# Patient Record
Sex: MALE | Race: WHITE | NOT HISPANIC OR LATINO | Employment: OTHER | URBAN - NONMETROPOLITAN AREA
[De-identification: names, ages, dates, MRNs, and addresses within clinical notes are randomized per-mention and may not be internally consistent; named-entity substitution may affect disease eponyms.]

---

## 2020-07-07 ENCOUNTER — OFFICE VISIT (OUTPATIENT)
Dept: PSYCHIATRY | Facility: CLINIC | Age: 55
End: 2020-07-07

## 2020-07-07 VITALS
DIASTOLIC BLOOD PRESSURE: 81 MMHG | WEIGHT: 262.6 LBS | SYSTOLIC BLOOD PRESSURE: 156 MMHG | HEART RATE: 84 BPM | TEMPERATURE: 97.3 F

## 2020-07-07 DIAGNOSIS — F51.04 PSYCHOPHYSIOLOGICAL INSOMNIA: ICD-10-CM

## 2020-07-07 DIAGNOSIS — F40.01 PANIC DISORDER WITH AGORAPHOBIA: Primary | ICD-10-CM

## 2020-07-07 DIAGNOSIS — F40.10 SOCIAL ANXIETY DISORDER: ICD-10-CM

## 2020-07-07 DIAGNOSIS — Z79.899 MEDICATION MANAGEMENT: ICD-10-CM

## 2020-07-07 DIAGNOSIS — F41.1 GAD (GENERALIZED ANXIETY DISORDER): ICD-10-CM

## 2020-07-07 DIAGNOSIS — F17.220 NICOTINE DEPENDENCE, CHEWING TOBACCO, UNCOMPLICATED: ICD-10-CM

## 2020-07-07 DIAGNOSIS — F11.21 OPIOID USE DISORDER, SEVERE, IN SUSTAINED REMISSION, ON MAINTENANCE THERAPY (HCC): ICD-10-CM

## 2020-07-07 LAB
AMPHETAMINE CUT-OFF: ABNORMAL
BENZODIAZIPINE CUT-OFF: ABNORMAL
BUPRENORPHINE CUT-OFF: ABNORMAL
COCAINE CUT-OFF: ABNORMAL
EXTERNAL AMPHETAMINE SCREEN URINE: NEGATIVE
EXTERNAL BENZODIAZEPINE SCREEN URINE: NEGATIVE
EXTERNAL BUPRENORPHINE SCREEN URINE: POSITIVE
EXTERNAL COCAINE SCREEN URINE: NEGATIVE
EXTERNAL MDMA: NEGATIVE
EXTERNAL METHADONE SCREEN URINE: NEGATIVE
EXTERNAL METHAMPHETAMINE SCREEN URINE: NEGATIVE
EXTERNAL OPIATES SCREEN URINE: NEGATIVE
EXTERNAL OXYCODONE SCREEN URINE: NEGATIVE
EXTERNAL THC SCREEN URINE: NEGATIVE
MDMA CUT-OFF: ABNORMAL
METHADONE CUT-OFF: ABNORMAL
METHAMPHETAMINE CUT-OFF: ABNORMAL
OPIATES CUT-OFF: ABNORMAL
OXYCODONE CUT-OFF: ABNORMAL
THC CUT-OFF: ABNORMAL

## 2020-07-07 PROCEDURE — 90792 PSYCH DIAG EVAL W/MED SRVCS: CPT | Performed by: PSYCHIATRY & NEUROLOGY

## 2020-07-07 RX ORDER — TRIAMCINOLONE ACETONIDE 1 MG/G
CREAM TOPICAL
COMMUNITY
Start: 2020-06-09

## 2020-07-07 RX ORDER — QUETIAPINE FUMARATE 50 MG/1
TABLET, FILM COATED ORAL
COMMUNITY
Start: 2020-07-03 | End: 2021-10-05 | Stop reason: SDUPTHER

## 2020-07-07 RX ORDER — LISINOPRIL 40 MG/1
TABLET ORAL
COMMUNITY
Start: 2020-07-03

## 2020-07-07 RX ORDER — ALBUTEROL SULFATE 90 UG/1
AEROSOL, METERED RESPIRATORY (INHALATION)
COMMUNITY
Start: 2020-06-01 | End: 2023-01-23

## 2020-07-07 RX ORDER — ONDANSETRON 4 MG/1
TABLET, ORALLY DISINTEGRATING ORAL
COMMUNITY
Start: 2020-04-22 | End: 2020-10-07

## 2020-07-07 RX ORDER — GABAPENTIN 800 MG/1
TABLET ORAL
COMMUNITY
Start: 2020-06-08

## 2020-07-07 RX ORDER — CLONAZEPAM 0.5 MG/1
0.5 TABLET ORAL DAILY PRN
Qty: 30 TABLET | Refills: 2 | Status: SHIPPED | OUTPATIENT
Start: 2020-07-07 | End: 2020-10-07 | Stop reason: SDUPTHER

## 2020-07-07 RX ORDER — BUPRENORPHINE HYDROCHLORIDE AND NALOXONE HYDROCHLORIDE 5.7; 1.4 MG/1; MG/1
TABLET, ORALLY DISINTEGRATING SUBLINGUAL
COMMUNITY
Start: 2020-07-06 | End: 2020-10-07

## 2020-07-07 NOTE — PROGRESS NOTES
"Subjective   Macario Aguilar is a 55 y.o. male who presents today for initial evaluation     Chief Complaint:  Anxiety, Panic Disorder, Social Anxiety    History of Present Illness: Patient is a 55-year-old  male with a history of anxiety, panic disorder, and social anxiety presenting for his initial evaluation by this provider.  He was previously seeing other providers but they have left or retired. His anxiety is currently managed by his PCP who referred him to our clinic today. He reports he has struggled with anxiety, \"my whole life.\" He reports first-line anxiety medications including Zoloft, Prozac, Effexor, Cymbalta, Paxil, and BuSpar.  He reports others that he cannot recall at the current moment.  He reports that he has generally had poor response or side effects to SSRI and SNRI medications.  BuSpar made things worse and made him feel that he was, \"crazy.\" He reports that Zoloft made him feel like a robot and decreased his sex drive. He reports it caused emotional blunting and anhedonia.  Cymbalta caused difficulty sleeping and restlessness with increased anxiety.  He is currently only managed on Seroquel for sleep and reports that it is helpful but it takes a while for it to kick in.  He had previously been on higher doses during hospitalization but it caused him to feel severely weak, lethargic, fatigued, and mentally foggy.  He has never had any inpatient admissions for psychiatric reasons but was hospitalized in [place] for what appeared to be neurologic or physical medical problems, however work-up was negative and they felt that he was having, \"a mental breakdown.\"  Patient currently does not see a psychiatrist or therapist.  He previously saw Dr. Wayne and was prescribed 0.5 mg of clonazepam once daily.  He reports that this helped the most and they did not increase this dose.  Patient does have a history of dependence on opioid pain medication.  He has never taken it outside of what was " prescribed to him but was injured on the job when he was working at a [employer] and was prescribed high doses of opioid pain medication for several years leading to physical dependence.  Patient attempted to stop the medications himself but had severe withdrawal and then saw another doctor who prescribed more pain medication which he was disgruntled by.  He eventually found a clinic for buprenorphine and has been managed on this successfully.  He takes 3 Zubsolv 5.7mg daily.     Patient currently has severe anxiety symptoms.  He is anxious in the clinic setting today both in the lobby and in the office initially before the clinical interview progressed.  He was able to calm some by the end of the interview.  He has severe social anxiety and self isolates to the home setting.  He has difficulty spending time with his grandchildren as he gets overwhelmed and anxious with a lot of external stimuli.  He avoids going to the grocery store or to the store for general goods.  He does not like to go out into public.  He gets anxious when talking to others easily.  He does have panic-like symptoms including racing heart, sweating, shaky voice, and a feeling of impending doom or chest tightness intermittently and increased frequency when having to go into social situations.  He was seen pacing the lobby today.  He denies any major depressive symptoms aside from dysphoria related to his level of anxiety and the decreased functioning causes him.  He denies any issues with sleep or appetite.  He denies SI/HI/AVH.    Patient lives in [place], [place] with his fiancée.  They plan to get .  Patient is on Social Security.  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 He chews tobacco occasionally and has never smoked tobacco products. He denies any illicit substance use and denies alcohol use. He has some college education but never completed a degree.    Patient has a  family history significant for anxiety in his mother and his maternal uncle who likely had PTSD as he served in World War II.    The following portions of the patient's history were reviewed and updated as appropriate: allergies, current medications, past family history, past medical history, past social history, past surgical history and problem list.      Past Medical History:  Past Medical History:   Diagnosis Date   • Anxiety        Social History:  Social History     Socioeconomic History   • Marital status:      Spouse name: Not on file   • Number of children: Not on file   • Years of education: Not on file   • Highest education level: Not on file   Tobacco Use   • Smoking status: Never Smoker   • Smokeless tobacco: Current User     Types: Chew   Substance and Sexual Activity   • Alcohol use: Never     Frequency: Never   • Drug use: Never   • Sexual activity: Defer       Family History:  Family History   Problem Relation Age of Onset   • Anxiety disorder Mother    • No Known Problems Father    • Anxiety disorder Maternal Uncle        Past Surgical History:  History reviewed. No pertinent surgical history.    Problem List:  There is no problem list on file for this patient.      Allergy:   Allergies   Allergen Reactions   • Demerol [Meperidine] Hives   • Penicillins Hives        Current Medications:   Current Outpatient Medications   Medication Sig Dispense Refill   • albuterol sulfate  (90 Base) MCG/ACT inhaler      • gabapentin (NEURONTIN) 800 MG tablet      • lisinopril (PRINIVIL,ZESTRIL) 40 MG tablet      • ondansetron ODT (ZOFRAN-ODT) 4 MG disintegrating tablet      • QUEtiapine (SEROquel) 50 MG tablet      • triamcinolone (KENALOG) 0.1 % cream      • ZUBSOLV 5.7-1.4 MG sublingual tablet        No current facility-administered medications for this visit.        Review of Symptoms:    Review of Systems   Constitutional: Positive for activity change. Negative for chills, diaphoresis, fatigue,  fever and unexpected weight loss.   HENT: Negative.    Eyes: Negative.    Respiratory: Negative.    Cardiovascular: Positive for palpitations. Negative for chest pain and leg swelling.   Gastrointestinal: Negative.    Endocrine: Negative.    Genitourinary: Negative.    Musculoskeletal: Positive for arthralgias, back pain and myalgias.   Skin: Negative.    Allergic/Immunologic: Negative.    Neurological: Negative.    Hematological: Negative.    Psychiatric/Behavioral: Positive for stress. The patient is nervous/anxious.          Physical Exam:   Blood pressure 156/81, pulse 84, temperature 97.3 °F (36.3 °C), weight 119 kg (262 lb 9.6 oz).    Appearance:  male of stated age in no acute distress, anxious, pacing  Gait, Station, Strength: Within normal limits    Mental Status Exam:   Hygiene:   good  Cooperation:  Cooperative  Eye Contact:  Good  Psychomotor Behavior:  Restless  Affect:  nervous  Mood: anxious  Hopelessness: 4  Speech:  Normal  Thought Process:  Goal directed and Linear  Thought Content:  Normal and Mood congruent  Suicidal:  None  Homicidal:  None  Hallucinations:  None  Delusion:  None  Memory:  Intact  Orientation:  Person, Place, Time and Situation  Reliability:  good  Insight:  Good  Judgement:  Good  Impulse Control:  Good  Physical/Medical Issues:  Yes chronic pain from previous work related accident       Lab Results:   No visits with results within 1 Month(s) from this visit.   Latest known visit with results is:   No results found for any previous visit.       Assessment/Plan   Diagnoses and all orders for this visit:    Panic disorder with agoraphobia  -     clonazePAM (KlonoPIN) 0.5 MG tablet; Take 1 tablet by mouth Daily As Needed for Anxiety.    Medication management  -     [facility] Drug Screen    JUAN LUIS (generalized anxiety disorder)  -     clonazePAM (KlonoPIN) 0.5 MG tablet; Take 1 tablet by mouth Daily As Needed for Anxiety.    Social anxiety disorder  -     clonazePAM (KlonoPIN)  0.5 MG tablet; Take 1 tablet by mouth Daily As Needed for Anxiety.    Psychophysiological insomnia  -     clonazePAM (KlonoPIN) 0.5 MG tablet; Take 1 tablet by mouth Daily As Needed for Anxiety.    Opioid use disorder, severe, in sustained remission, on maintenance therapy (CMS/[facility])    Nicotine dependence, chewing tobacco, uncomplicated    -Patient presents for his initial encounter and carries a diagnosis of panic disorder, generalized anxiety disorder, social anxiety disorder, insomnia, and opioid use disorder currently on maintenance therapy.  Patient has had previous treatment with multiple SSRIs and SNRI type medications with negative side effects or treatment failure.  In some instances symptoms are worse.  Patient has previously been managed on clonazepam 0.5 mg p.o. daily to good effect.   -Reviewed previous available documentation  -Reviewed previous medical records and scanned to chart  -Reviewed most recent available labs  -UDS ordered and appropriate  -MICHEAL reviewed and appropriate. Patient counseled on use of controlled substances.   -Continue Seroquel 50 mg p.o. nightly for insomnia.  Patient gets this from an outside provider  -Start clonazepam 0.5 mg p.o. daily as needed for anxiety or panic.  Patient advised of the risks, benefits, and side effects of benzodiazepine use.  He was cautioned on the concurrent use of buprenorphine and benzodiazepines including respiratory suppression and death.  Patient has had previous treatment failures with multiple medications that are first-line for anxiety.  I do not feel the patient is at risk of diversion or misuse and we will plan to keep his dose of benzodiazepine medication very low to account for his concurrent use of opioid maintenance therapy.  Patient was understanding of the risks and treatment guidelines.  -Encourage cessation of tobacco products  -Encouraged patient to consider therapy    Visit Diagnoses:    ICD-10-CM ICD-9-CM   1. Panic disorder  with agoraphobia F40.01 300.21   2. Medication management Z79.899 V58.69   3. JUAN LUIS (generalized anxiety disorder) F41.1 300.02   4. Social anxiety disorder F40.10 300.23   5. Psychophysiological insomnia F51.04 307.42   6. Opioid use disorder, severe, in sustained remission, on maintenance therapy (CMS/[facility]) F11.21 305.53   7. Nicotine dependence, chewing tobacco, uncomplicated F17.220 305.1       TREATMENT PLAN/GOALS: Continue supportive psychotherapy efforts and medications as indicated. Treatment and medication options discussed during today's visit. Patient acknowledged and verbally consented to continue with current treatment plan and was educated on the importance of compliance with treatment and follow-up appointments.    MEDICATION ISSUES:    Discussed medication options and treatment plan of prescribed medication as well as the risks, benefits, and side effects including potential falls, possible impaired driving and metabolic adversities among others. Patient is agreeable to call the office with any worsening of symptoms or onset of side effects. Patient is agreeable to call 911 or go to the nearest ER should he/she begin having SI/HI.     MEDS ORDERED DURING VISIT:  New Medications Ordered This Visit   Medications   • clonazePAM (KlonoPIN) 0.5 MG tablet     Sig: Take 1 tablet by mouth Daily As Needed for Anxiety.     Dispense:  30 tablet     Refill:  2       Return in about 3 months (around 10/7/2020).             This document has been electronically signed by Yanick Coon MD  July 7, 2020 13:08

## 2020-10-07 ENCOUNTER — OFFICE VISIT (OUTPATIENT)
Dept: PSYCHIATRY | Facility: CLINIC | Age: 55
End: 2020-10-07

## 2020-10-07 VITALS
WEIGHT: 247 LBS | HEIGHT: 72 IN | HEART RATE: 64 BPM | TEMPERATURE: 97.3 F | DIASTOLIC BLOOD PRESSURE: 80 MMHG | BODY MASS INDEX: 33.46 KG/M2 | SYSTOLIC BLOOD PRESSURE: 178 MMHG

## 2020-10-07 DIAGNOSIS — F40.10 SOCIAL ANXIETY DISORDER: ICD-10-CM

## 2020-10-07 DIAGNOSIS — F51.04 PSYCHOPHYSIOLOGICAL INSOMNIA: ICD-10-CM

## 2020-10-07 DIAGNOSIS — Z79.899 MEDICATION MANAGEMENT: ICD-10-CM

## 2020-10-07 DIAGNOSIS — F41.1 GAD (GENERALIZED ANXIETY DISORDER): Primary | ICD-10-CM

## 2020-10-07 DIAGNOSIS — F40.01 PANIC DISORDER WITH AGORAPHOBIA: ICD-10-CM

## 2020-10-07 PROCEDURE — 99214 OFFICE O/P EST MOD 30 MIN: CPT | Performed by: PSYCHIATRY & NEUROLOGY

## 2020-10-07 RX ORDER — BUPRENORPHINE AND NALOXONE 8; 2 MG/1; MG/1
FILM, SOLUBLE BUCCAL; SUBLINGUAL DAILY
COMMUNITY

## 2020-10-07 RX ORDER — CLONAZEPAM 0.5 MG/1
0.5 TABLET ORAL DAILY PRN
Qty: 30 TABLET | Refills: 4 | Status: SHIPPED | OUTPATIENT
Start: 2020-10-07 | End: 2021-02-08 | Stop reason: SDUPTHER

## 2020-10-07 RX ORDER — PRAZOSIN HYDROCHLORIDE 1 MG/1
CAPSULE ORAL
COMMUNITY
Start: 2020-07-31 | End: 2021-10-04

## 2020-10-07 RX ORDER — NALOXONE HYDROCHLORIDE 4 MG/.1ML
SPRAY NASAL
COMMUNITY
Start: 2020-08-11

## 2020-10-07 RX ORDER — BUPRENORPHINE AND NALOXONE 8; 2 MG/1; MG/1
FILM, SOLUBLE BUCCAL; SUBLINGUAL
COMMUNITY
Start: 2020-09-29 | End: 2020-10-07

## 2020-10-07 RX ORDER — ONDANSETRON 4 MG/1
TABLET, FILM COATED ORAL
COMMUNITY
Start: 2020-09-25

## 2020-10-13 NOTE — PROGRESS NOTES
"Subjective   Macario Aguilar is a 55 y.o. male who presents today for follow up    Chief Complaint:  Anxiety, Panic Disorder, Social Anxiety    History of Present Illness: Patient is a 55-year-old  male with a history of anxiety, panic disorder, and social anxiety presenting for follow-up.  This is my second encounter with the patient.  He reports today that he is, \"doing really well.\"  He states that he is enjoying life again and is more active in his day-to-day activities.  He states that his family even noticed that he was doing much better than he was at our last visit.  He feels comfortable on the current medication regimen.  He denies any medication side effects.  He denies issues with sleep or appetite.  He denies SI/HI/AVH.    The following portions of the patient's history were reviewed and updated as appropriate: allergies, current medications, past family history, past medical history, past social history, past surgical history and problem list.      Past Medical History:  Past Medical History:   Diagnosis Date   • Anxiety        Social History:  Social History     Socioeconomic History   • Marital status:      Spouse name: Not on file   • Number of children: Not on file   • Years of education: Not on file   • Highest education level: Not on file   Tobacco Use   • Smoking status: Never Smoker   • Smokeless tobacco: Current User     Types: Chew   Substance and Sexual Activity   • Alcohol use: Never     Frequency: Never   • Drug use: Never   • Sexual activity: Defer       Family History:  Family History   Problem Relation Age of Onset   • Anxiety disorder Mother    • No Known Problems Father    • Anxiety disorder Maternal Uncle        Past Surgical History:  History reviewed. No pertinent surgical history.    Problem List:  There is no problem list on file for this patient.      Allergy:   Allergies   Allergen Reactions   • Demerol [Meperidine] Hives   • Penicillins Hives        Current " "Medications:   Current Outpatient Medications   Medication Sig Dispense Refill   • albuterol sulfate  (90 Base) MCG/ACT inhaler      • buprenorphine-naloxone (SUBOXONE) 8-2 MG film film Place  under the tongue Daily.     • clonazePAM (KlonoPIN) 0.5 MG tablet Take 1 tablet by mouth Daily As Needed for Anxiety. 30 tablet 4   • gabapentin (NEURONTIN) 800 MG tablet      • lisinopril (PRINIVIL,ZESTRIL) 40 MG tablet      • Narcan 4 MG/0.1ML nasal spray      • ondansetron (ZOFRAN) 4 MG tablet      • prazosin (MINIPRESS) 1 MG capsule      • QUEtiapine (SEROquel) 50 MG tablet      • triamcinolone (KENALOG) 0.1 % cream        No current facility-administered medications for this visit.        Review of Symptoms:    Review of Systems   Constitutional: Positive for activity change (improved). Negative for chills, diaphoresis, fatigue, fever and unexpected weight loss.   HENT: Negative.    Eyes: Negative.    Respiratory: Negative.    Cardiovascular: Positive for palpitations. Negative for chest pain and leg swelling.   Gastrointestinal: Negative.    Endocrine: Negative.    Genitourinary: Negative.    Musculoskeletal: Positive for arthralgias, back pain and myalgias.   Skin: Negative.    Allergic/Immunologic: Negative.    Neurological: Negative.    Hematological: Negative.    Psychiatric/Behavioral: Negative for stress. The patient is not nervous/anxious.          Physical Exam:   Blood pressure 178/80, pulse 64, temperature 97.3 °F (36.3 °C), height 182.9 cm (72\"), weight 112 kg (247 lb).    Appearance:  male of stated age in no acute distress, NAD  Gait, Station, Strength: Within normal limits    Mental Status Exam:   Hygiene:   good  Cooperation:  Cooperative  Eye Contact:  Good  Psychomotor Behavior:  Appropriate  Affect:  Full range  Mood: normal  Hopelessness: Optimistic  Speech:  Normal  Thought Process:  Goal directed and Linear  Thought Content:  Normal and Mood congruent  Suicidal:  None  Homicidal:  " None  Hallucinations:  None  Delusion:  None  Memory:  Intact  Orientation:  Person, Place, Time and Situation  Reliability:  good  Insight:  Good  Judgement:  Good  Impulse Control:  Good  Physical/Medical Issues:  Yes chronic pain from previous work related accident       Lab Results:   Office Visit on 10/07/2020   Component Date Value Ref Range Status   • External Amphetamine Screen Urine 10/07/2020 Negative   Final   • Amphetamine Cut-Off 10/07/2020 1000ng/ml   Final   • External Benzodiazepine Screen Uri* 10/07/2020 Negative   Final   • Benzodiazipine Cut-Off 10/07/2020 300ng/ml   Final   • External Cocaine Screen Urine 10/07/2020 Negative   Final   • Cocaine Cut-Off 10/07/2020 300ng/ml   Final   • External THC Screen Urine 10/07/2020 Negative   Final   • THC Cut-Off 10/07/2020 50ng/ml   Final   • External Methadone Screen Urine 10/07/2020 Negative   Final   • Methadone Cut-Off 10/07/2020 300ng/ml   Final   • External Methamphetamine Screen Ur* 10/07/2020 Negative   Final   • Methamphetamine Cut-Off 10/07/2020 1000ng/ml   Final   • External Oxycodone Screen Urine 10/07/2020 Negative   Final   • Oxycodone Cut-Off 10/07/2020 100ng/ml   Final   • External Buprenorphine Screen Urine 10/07/2020 Positive   Final   • Buprenorphine Cut-Off 10/07/2020 10ng/ml   Final   • External MDMA 10/07/2020 Negative   Final   • MDMA Cut-Off 10/07/2020 500ng/ml   Final   • External Opiates Screen Urine 10/07/2020 Negative   Final   • Opiates Cut-Off 10/07/2020 300ng/ml   Final       Assessment/Plan   Diagnoses and all orders for this visit:    1. JUAN LUIS (generalized anxiety disorder) (Primary)  -     clonazePAM (KlonoPIN) 0.5 MG tablet; Take 1 tablet by mouth Daily As Needed for Anxiety.  Dispense: 30 tablet; Refill: 4    2. Medication management  -     [facility] Drug Screen    3. Panic disorder with agoraphobia  -     clonazePAM (KlonoPIN) 0.5 MG tablet; Take 1 tablet by mouth Daily As Needed for Anxiety.  Dispense: 30 tablet; Refill:  4    4. Social anxiety disorder  -     clonazePAM (KlonoPIN) 0.5 MG tablet; Take 1 tablet by mouth Daily As Needed for Anxiety.  Dispense: 30 tablet; Refill: 4    5. Psychophysiological insomnia  -     clonazePAM (KlonoPIN) 0.5 MG tablet; Take 1 tablet by mouth Daily As Needed for Anxiety.  Dispense: 30 tablet; Refill: 4    -Patient reports today that he is doing very well.  He states that his family even notices the improvement in his overall symptom burden.  He is also more active in his day-to-day life.  -Reviewed previous available documentation  -Reviewed previous medical records and scanned to chart  -Reviewed most recent available labs  -UDS ordered and appropriate  -MICHEAL reviewed and appropriate. Patient counseled on use of controlled substances.   -Continue Seroquel 50 mg p.o. nightly for insomnia.  Patient gets this from an outside provider  -Continue clonazepam 0.5 mg p.o. daily as needed for anxiety.  Patient advised of the risks of using benzodiazepines with concurrent opioid maintenance therapy.  In weighing the risks and benefits, patient is more active and functional during the day and is also taking an active role in his day-to-day life which is a functional improvement over before.  We will continue the medication for now.  The eventual goal will be to taper off of the medication completely or have patient taper off of his Suboxone, however he has been on benzodiazepines for an extended amount of time and this may prove to be difficult  -Encourage cessation of tobacco products  -Encouraged patient to consider therapy    Visit Diagnoses:    ICD-10-CM ICD-9-CM   1. Medication management  Z79.899 V58.69   2. Panic disorder with agoraphobia  F40.01 300.21   3. JUAN LUIS (generalized anxiety disorder)  F41.1 300.02   4. Social anxiety disorder  F40.10 300.23   5. Psychophysiological insomnia  F51.04 307.42       TREATMENT PLAN/GOALS: Continue supportive psychotherapy efforts and medications as indicated.  Treatment and medication options discussed during today's visit. Patient acknowledged and verbally consented to continue with current treatment plan and was educated on the importance of compliance with treatment and follow-up appointments.    MEDICATION ISSUES:    Discussed medication options and treatment plan of prescribed medication as well as the risks, benefits, and side effects including potential falls, possible impaired driving and metabolic adversities among others. Patient is agreeable to call the office with any worsening of symptoms or onset of side effects. Patient is agreeable to call 911 or go to the nearest ER should he/she begin having SI/HI.     MEDS ORDERED DURING VISIT:  New Medications Ordered This Visit   Medications   • clonazePAM (KlonoPIN) 0.5 MG tablet     Sig: Take 1 tablet by mouth Daily As Needed for Anxiety.     Dispense:  30 tablet     Refill:  4       Return in about 6 months (around 4/7/2021).             This document has been electronically signed by Yanick Coon MD  October 13, 2020 14:38 EDT

## 2021-02-08 ENCOUNTER — OFFICE VISIT (OUTPATIENT)
Dept: PSYCHIATRY | Facility: CLINIC | Age: 56
End: 2021-02-08

## 2021-02-08 VITALS
HEART RATE: 79 BPM | DIASTOLIC BLOOD PRESSURE: 87 MMHG | HEIGHT: 72 IN | WEIGHT: 224.6 LBS | SYSTOLIC BLOOD PRESSURE: 149 MMHG | TEMPERATURE: 97.2 F | BODY MASS INDEX: 30.42 KG/M2

## 2021-02-08 DIAGNOSIS — F40.01 PANIC DISORDER WITH AGORAPHOBIA: Primary | ICD-10-CM

## 2021-02-08 DIAGNOSIS — F11.21 OPIOID USE DISORDER, SEVERE, IN SUSTAINED REMISSION, ON MAINTENANCE THERAPY (HCC): ICD-10-CM

## 2021-02-08 DIAGNOSIS — Z79.899 MEDICATION MANAGEMENT: ICD-10-CM

## 2021-02-08 DIAGNOSIS — F40.10 SOCIAL ANXIETY DISORDER: ICD-10-CM

## 2021-02-08 DIAGNOSIS — F51.04 PSYCHOPHYSIOLOGICAL INSOMNIA: ICD-10-CM

## 2021-02-08 DIAGNOSIS — F41.1 GAD (GENERALIZED ANXIETY DISORDER): ICD-10-CM

## 2021-02-08 PROCEDURE — 99214 OFFICE O/P EST MOD 30 MIN: CPT | Performed by: PSYCHIATRY & NEUROLOGY

## 2021-02-08 RX ORDER — CLONAZEPAM 0.5 MG/1
TABLET ORAL
Qty: 45 TABLET | Refills: 3 | Status: SHIPPED | OUTPATIENT
Start: 2021-02-08 | End: 2021-06-08 | Stop reason: SDUPTHER

## 2021-02-26 NOTE — PROGRESS NOTES
Subjective   Macario Aguilar is a 55 y.o. male who presents today for follow up    Chief Complaint:  Anxiety, Panic Disorder, Social Anxiety    History of Present Illness: Patient is a 55-year-old  male with a history of anxiety, panic disorder, and social anxiety presenting for follow-up.  Patient reports that he continues to do relatively well.  He can tell when the medication is wearing off as he has increased anxiety symptoms in the afternoon.  It is likely combination of rebound anxiety and anxiety control for the evenings.  He states that there was an incident in which she went to the store in the late afternoon and was rushing to get out of the store as he felt overwhelmed by the amount of people and anxious and fell on the curb leaving the store.  Patient is taking Suboxone and clonazepam concurrently.  Patient has been repeatedly warned of the risk of benzodiazepine use with concurrent opioid use including respiratory suppression and death.  He is closely monitored and is maintained on the low-dose of clonazepam at this time.  He has not had any issues related to these interactions.  He has not had any falls related to sedation.  Well ideally we would avoid prescribing these 2 medications together, patient is able to maintain some semblance of normalcy with this regimen and is able to participate in activities of his life leading to improved medication compliance, healthcare compliance, and depressive symptoms.  He denies any medication side effects.  He denies issues with sleep or appetite.  He denies SI/HI/AVH.    The following portions of the patient's history were reviewed and updated as appropriate: allergies, current medications, past family history, past medical history, past social history, past surgical history and problem list.      Past Medical History:  Past Medical History:   Diagnosis Date   • Anxiety        Social History:  Social History     Socioeconomic History   • Marital status:       Spouse name: Not on file   • Number of children: Not on file   • Years of education: Not on file   • Highest education level: Not on file   Tobacco Use   • Smoking status: Never Smoker   • Smokeless tobacco: Current User     Types: Chew   Substance and Sexual Activity   • Alcohol use: Never     Frequency: Never   • Drug use: Never   • Sexual activity: Defer       Family History:  Family History   Problem Relation Age of Onset   • Anxiety disorder Mother    • No Known Problems Father    • Anxiety disorder Maternal Uncle        Past Surgical History:  History reviewed. No pertinent surgical history.    Problem List:  There is no problem list on file for this patient.      Allergy:   Allergies   Allergen Reactions   • Demerol [Meperidine] Hives   • Penicillins Hives        Current Medications:   Current Outpatient Medications   Medication Sig Dispense Refill   • albuterol sulfate  (90 Base) MCG/ACT inhaler      • buprenorphine-naloxone (SUBOXONE) 8-2 MG film film Place  under the tongue Daily.     • clonazePAM (KlonoPIN) 0.5 MG tablet Take 1 tablet PO daily as needed for anxiety and take 0.5 tablets PO in the afternoon as needed for anxiety. 45 tablet 3   • gabapentin (NEURONTIN) 800 MG tablet      • lisinopril (PRINIVIL,ZESTRIL) 40 MG tablet      • Narcan 4 MG/0.1ML nasal spray      • ondansetron (ZOFRAN) 4 MG tablet      • prazosin (MINIPRESS) 1 MG capsule      • QUEtiapine (SEROquel) 50 MG tablet      • triamcinolone (KENALOG) 0.1 % cream        No current facility-administered medications for this visit.        Review of Symptoms:    Review of Systems   Constitutional: Positive for activity change (improved). Negative for chills, diaphoresis, fatigue, fever and unexpected weight loss.   HENT: Negative.    Eyes: Negative.    Respiratory: Positive for shortness of breath.    Cardiovascular: Positive for palpitations. Negative for chest pain and leg swelling.   Gastrointestinal: Negative.   "  Endocrine: Negative.    Genitourinary: Negative.    Musculoskeletal: Positive for arthralgias, back pain and myalgias.   Skin: Negative.    Allergic/Immunologic: Negative.    Neurological: Positive for tremors.   Hematological: Negative.    Psychiatric/Behavioral: Negative for stress. The patient is not nervous/anxious.          Physical Exam:   Blood pressure 149/87, pulse 79, temperature 97.2 °F (36.2 °C), height 182.9 cm (72.01\"), weight 102 kg (224 lb 9.6 oz).    Appearance:  male of stated age in no acute distress, NAD  Gait, Station, Strength: Within normal limits    Mental Status Exam:   Hygiene:   good  Cooperation:  Cooperative  Eye Contact:  Good  Psychomotor Behavior:  Appropriate  Affect:  Full range  Mood: normal, some increased anxiety at night   Hopelessness: Optimistic  Speech:  Normal  Thought Process:  Goal directed and Linear  Thought Content:  Normal and Mood congruent  Suicidal:  None  Homicidal:  None  Hallucinations:  None  Delusion:  None  Memory:  Intact  Orientation:  Person, Place, Time and Situation  Reliability:  good  Insight:  Good  Judgement:  Good  Impulse Control:  Good  Physical/Medical Issues:  Yes chronic pain from previous work related accident       Lab Results:   Office Visit on 02/08/2021   Component Date Value Ref Range Status   • External Amphetamine Screen Urine 02/08/2021 Negative   Final   • Amphetamine Cut-Off 02/08/2021 1000ng/ml   Final   • External Benzodiazepine Screen Uri* 02/08/2021 Negative   Final   • Benzodiazipine Cut-Off 02/08/2021 300ng/ml   Final   • External Cocaine Screen Urine 02/08/2021 Negative   Final   • Cocaine Cut-Off 02/08/2021 300ng/ml   Final   • External THC Screen Urine 02/08/2021 Negative   Final   • THC Cut-Off 02/08/2021 50ng/ml   Final   • External Methadone Screen Urine 02/08/2021 Negative   Final   • Methadone Cut-Off 02/08/2021 300ng/ml   Final   • External Methamphetamine Screen Ur* 02/08/2021 Negative   Final   • " Methamphetamine Cut-Off 02/08/2021 1000ng/ml   Final   • External Oxycodone Screen Urine 02/08/2021 Negative   Final   • Oxycodone Cut-Off 02/08/2021 100ng/ml   Final   • External Buprenorphine Screen Urine 02/08/2021 Positive   Final   • Buprenorphine Cut-Off 02/08/2021 10ng/ml   Final   • External MDMA 02/08/2021 Negative   Final   • MDMA Cut-Off 02/08/2021 500ng/ml   Final   • External Opiates Screen Urine 02/08/2021 Negative   Final   • Opiates Cut-Off 02/08/2021 300ng/ml   Final       Assessment/Plan   Diagnoses and all orders for this visit:    1. Panic disorder with agoraphobia (Primary)  -     clonazePAM (KlonoPIN) 0.5 MG tablet; Take 1 tablet PO daily as needed for anxiety and take 0.5 tablets PO in the afternoon as needed for anxiety.  Dispense: 45 tablet; Refill: 3    2. Medication management  -     [facility] Screen    3. JUAN LUIS (generalized anxiety disorder)  -     clonazePAM (KlonoPIN) 0.5 MG tablet; Take 1 tablet PO daily as needed for anxiety and take 0.5 tablets PO in the afternoon as needed for anxiety.  Dispense: 45 tablet; Refill: 3    4. Social anxiety disorder  -     clonazePAM (KlonoPIN) 0.5 MG tablet; Take 1 tablet PO daily as needed for anxiety and take 0.5 tablets PO in the afternoon as needed for anxiety.  Dispense: 45 tablet; Refill: 3    5. Psychophysiological insomnia  -     clonazePAM (KlonoPIN) 0.5 MG tablet; Take 1 tablet PO daily as needed for anxiety and take 0.5 tablets PO in the afternoon as needed for anxiety.  Dispense: 45 tablet; Refill: 3    6. Opioid use disorder, severe, in sustained remission, on maintenance therapy (CMS/[facility])    -Patient continues to do relatively well but is having some increased anxiety in the afternoons after his morning clonazepam wears off.  This is leading to worsening rebound anxiety as well as recurrence of his anxiety symptoms leading to some difficult anxiety episodes that are detrimental to his overall functioning  -Reviewed previous available  documentation  -Reviewed previous medical records and scanned to chart  -Reviewed most recent available labs  -UDS ordered and appropriate  -MICHEAL reviewed and appropriate. Patient counseled on use of controlled substances.   -Continue Seroquel 50 mg p.o. nightly for insomnia.  Patient gets this from an outside provider  -Increase clonazepam 0.5 mg p.o. daily as needed to 0.5 mg p.o. daily and 0.25 mg p.o. in the afternoon as needed for anxiety.  Patient advised of the risks of using benzodiazepines with concurrent opioid maintenance therapy.  In weighing the risks and benefits, patient is more active and functional during the day and is also taking an active role in his day-to-day life which is a functional improvement over before.  We will continue the medication for now.  The eventual goal will be to taper off of the medication completely or have patient taper off of his Suboxone, however he has been on benzodiazepines for an extended amount of time and this may prove to be difficult. Patient will be maintained on lowest dose possible due to these issues and interactions.   -Encouraged to continue maintenance therapy of Suboxone 16 mg sublingually daily for opioid use disorder.  Eventual goal is to taper off of her medication  -Encourage cessation of tobacco products  -Encouraged patient to consider therapy    Visit Diagnoses:    ICD-10-CM ICD-9-CM   1. Panic disorder with agoraphobia  F40.01 300.21   2. Medication management  Z79.899 V58.69   3. JUAN LUIS (generalized anxiety disorder)  F41.1 300.02   4. Social anxiety disorder  F40.10 300.23   5. Psychophysiological insomnia  F51.04 307.42   6. Opioid use disorder, severe, in sustained remission, on maintenance therapy (CMS/[facility])  F11.21 305.53       TREATMENT PLAN/GOALS: Continue supportive psychotherapy efforts and medications as indicated. Treatment and medication options discussed during today's visit. Patient acknowledged and verbally consented to continue with  current treatment plan and was educated on the importance of compliance with treatment and follow-up appointments.    MEDICATION ISSUES:    Discussed medication options and treatment plan of prescribed medication as well as the risks, benefits, and side effects including potential falls, possible impaired driving and metabolic adversities among others. Patient is agreeable to call the office with any worsening of symptoms or onset of side effects. Patient is agreeable to call 911 or go to the nearest ER should he/she begin having SI/HI.     MEDS ORDERED DURING VISIT:  New Medications Ordered This Visit   Medications   • clonazePAM (KlonoPIN) 0.5 MG tablet     Sig: Take 1 tablet PO daily as needed for anxiety and take 0.5 tablets PO in the afternoon as needed for anxiety.     Dispense:  45 tablet     Refill:  3       Return in about 3 months (around 5/8/2021).             This document has been electronically signed by Yanick Coon MD  February 26, 2021 08:42 EST

## 2021-06-08 DIAGNOSIS — F51.04 PSYCHOPHYSIOLOGICAL INSOMNIA: ICD-10-CM

## 2021-06-08 DIAGNOSIS — F41.1 GAD (GENERALIZED ANXIETY DISORDER): ICD-10-CM

## 2021-06-08 DIAGNOSIS — F40.01 PANIC DISORDER WITH AGORAPHOBIA: ICD-10-CM

## 2021-06-08 DIAGNOSIS — F40.10 SOCIAL ANXIETY DISORDER: ICD-10-CM

## 2021-06-08 RX ORDER — CLONAZEPAM 0.5 MG/1
TABLET ORAL
Qty: 45 TABLET | Refills: 3 | Status: SHIPPED | OUTPATIENT
Start: 2021-06-08 | End: 2021-07-12 | Stop reason: SDUPTHER

## 2021-07-12 ENCOUNTER — OFFICE VISIT (OUTPATIENT)
Dept: PSYCHIATRY | Facility: CLINIC | Age: 56
End: 2021-07-12

## 2021-07-12 VITALS
HEIGHT: 72 IN | OXYGEN SATURATION: 98 % | DIASTOLIC BLOOD PRESSURE: 74 MMHG | BODY MASS INDEX: 27.63 KG/M2 | TEMPERATURE: 98.2 F | WEIGHT: 204 LBS | HEART RATE: 91 BPM | SYSTOLIC BLOOD PRESSURE: 142 MMHG

## 2021-07-12 DIAGNOSIS — F51.04 PSYCHOPHYSIOLOGICAL INSOMNIA: ICD-10-CM

## 2021-07-12 DIAGNOSIS — F41.1 GAD (GENERALIZED ANXIETY DISORDER): Primary | ICD-10-CM

## 2021-07-12 DIAGNOSIS — F11.21 OPIOID USE DISORDER, SEVERE, IN SUSTAINED REMISSION, ON MAINTENANCE THERAPY (HCC): ICD-10-CM

## 2021-07-12 DIAGNOSIS — F40.01 PANIC DISORDER WITH AGORAPHOBIA: ICD-10-CM

## 2021-07-12 DIAGNOSIS — F17.220 NICOTINE DEPENDENCE, CHEWING TOBACCO, UNCOMPLICATED: ICD-10-CM

## 2021-07-12 DIAGNOSIS — F40.10 SOCIAL ANXIETY DISORDER: ICD-10-CM

## 2021-07-12 PROCEDURE — 99214 OFFICE O/P EST MOD 30 MIN: CPT | Performed by: PSYCHIATRY & NEUROLOGY

## 2021-07-12 RX ORDER — TRIAMCINOLONE ACETONIDE 0.25 MG/ML
LOTION TOPICAL
COMMUNITY
Start: 2021-04-21 | End: 2023-01-23

## 2021-07-12 RX ORDER — CLONAZEPAM 0.5 MG/1
0.5 TABLET ORAL 2 TIMES DAILY PRN
Qty: 60 TABLET | Refills: 2 | Status: SHIPPED | OUTPATIENT
Start: 2021-07-12 | End: 2021-10-04 | Stop reason: SDUPTHER

## 2021-07-12 RX ORDER — LATANOPROST 50 UG/ML
1 SOLUTION/ DROPS OPHTHALMIC
COMMUNITY
Start: 2021-06-21 | End: 2023-01-23

## 2021-07-12 RX ORDER — AMLODIPINE BESYLATE 5 MG/1
TABLET ORAL
COMMUNITY
Start: 2021-06-21

## 2021-07-12 RX ORDER — LISINOPRIL 20 MG/1
TABLET ORAL
COMMUNITY
Start: 2021-06-21 | End: 2021-07-12 | Stop reason: SDUPTHER

## 2021-07-12 NOTE — PROGRESS NOTES
Subjective   Macario Aguilar is a 56 y.o. male who presents today for follow up    Chief Complaint: Panic disorder, social anxiety, anxiety    History of Present Illness: Patient reports that he continues to do relatively well.  Clonazepam allows him to participate more fully in his day-to-day life and maintain relationships as his anxiety was prohibitive to this previously.  We had initiated a midday, smaller dose at last visit and this seems to have helped but does not control his symptoms as long as the morning dose does so he finds his late evenings difficult.  He is not having any side effects.  He denies any sedation or falls.  He denies any issues with sleep or appetite.  He denies SI/HI/AVH.    The following portions of the patient's history were reviewed and updated as appropriate: allergies, current medications, past family history, past medical history, past social history, past surgical history and problem list.      Past Medical History:  Past Medical History:   Diagnosis Date   • Anxiety        Social History:  Social History     Socioeconomic History   • Marital status:      Spouse name: Not on file   • Number of children: Not on file   • Years of education: Not on file   • Highest education level: Not on file   Tobacco Use   • Smoking status: Never Smoker   • Smokeless tobacco: Current User     Types: Chew   Vaping Use   • Vaping Use: Never used   Substance and Sexual Activity   • Alcohol use: Never   • Drug use: Never   • Sexual activity: Defer       Family History:  Family History   Problem Relation Age of Onset   • Anxiety disorder Mother    • No Known Problems Father    • Anxiety disorder Maternal Uncle        Past Surgical History:  No past surgical history on file.    Problem List:  There is no problem list on file for this patient.      Allergy:   Allergies   Allergen Reactions   • Demerol [Meperidine] Hives   • Penicillins Hives        Current Medications:   Current Outpatient  "Medications   Medication Sig Dispense Refill   • albuterol sulfate  (90 Base) MCG/ACT inhaler      • buprenorphine-naloxone (SUBOXONE) 8-2 MG film film Place  under the tongue Daily.     • clonazePAM (KlonoPIN) 0.5 MG tablet Take 1 tablet PO daily as needed for anxiety and take 0.5 tablets PO in the afternoon as needed for anxiety. 45 tablet 3   • gabapentin (NEURONTIN) 800 MG tablet      • lisinopril (PRINIVIL,ZESTRIL) 40 MG tablet      • Narcan 4 MG/0.1ML nasal spray      • ondansetron (ZOFRAN) 4 MG tablet      • prazosin (MINIPRESS) 1 MG capsule      • QUEtiapine (SEROquel) 50 MG tablet      • triamcinolone (KENALOG) 0.1 % cream      • amLODIPine (NORVASC) 5 MG tablet TAKE 1 TABLET BY MOUTH EVERY DAY FOR BLOOD PRESSURE. DOSE INCREASE.     • latanoprost (XALATAN) 0.005 % ophthalmic solution Administer 1 drop to both eyes every night at bedtime.     • lisinopril (PRINIVIL,ZESTRIL) 20 MG tablet TAKE 1 TABLET BY MOUTH TWO TIMES A DAY FOR BLOOD PRESSURE     • Triamcinolone Acetonide 0.025 % lotion APPLY SPARINGLY TO AFFECTED AREA(S) DAILY       No current facility-administered medications for this visit.       Review of Symptoms:    Review of Systems   Constitutional: Positive for activity change (improved). Negative for chills, diaphoresis, fatigue, fever and unexpected weight loss.   HENT: Negative.    Eyes: Negative.    Respiratory: Positive for shortness of breath.    Cardiovascular: Positive for palpitations. Negative for chest pain and leg swelling.   Gastrointestinal: Negative.    Endocrine: Negative.    Genitourinary: Negative.    Musculoskeletal: Positive for arthralgias, back pain and myalgias.   Skin: Negative.    Allergic/Immunologic: Negative.    Neurological: Positive for tremors.   Hematological: Negative.    Psychiatric/Behavioral: Negative for stress. The patient is not nervous/anxious.          Physical Exam:   Temperature 98.2 °F (36.8 °C), height 182.9 cm (72.01\"), weight 92.5 kg (204 " skylar).    Appearance:  male of stated age in no acute distress, NAD  Gait, Station, Strength: Within normal limits    Mental Status Exam:   Hygiene:   good  Cooperation:  Cooperative  Eye Contact:  Good  Psychomotor Behavior:  Appropriate  Affect:  Full range  Mood: normal, anxiety problematic in the late evening  Hopelessness: Optimistic  Speech:  Normal  Thought Process:  Goal directed and Linear  Thought Content:  Normal and Mood congruent  Suicidal:  None  Homicidal:  None  Hallucinations:  None  Delusion:  None  Memory:  Intact  Orientation:  Person, Place, Time and Situation  Reliability:  good  Insight:  Good  Judgement:  Good  Impulse Control:  Good  Physical/Medical Issues:  Yes chronic pain from previous work related accident       Lab Results:   No visits with results within 1 Month(s) from this visit.   Latest known visit with results is:   Office Visit on 02/08/2021   Component Date Value Ref Range Status   • External Amphetamine Screen Urine 02/08/2021 Negative   Final   • Amphetamine Cut-Off 02/08/2021 1000ng/ml   Final   • External Benzodiazepine Screen Uri* 02/08/2021 Negative   Final   • Benzodiazipine Cut-Off 02/08/2021 300ng/ml   Final   • External Cocaine Screen Urine 02/08/2021 Negative   Final   • Cocaine Cut-Off 02/08/2021 300ng/ml   Final   • External THC Screen Urine 02/08/2021 Negative   Final   • THC Cut-Off 02/08/2021 50ng/ml   Final   • External Methadone Screen Urine 02/08/2021 Negative   Final   • Methadone Cut-Off 02/08/2021 300ng/ml   Final   • External Methamphetamine Screen Ur* 02/08/2021 Negative   Final   • Methamphetamine Cut-Off 02/08/2021 1000ng/ml   Final   • External Oxycodone Screen Urine 02/08/2021 Negative   Final   • Oxycodone Cut-Off 02/08/2021 100ng/ml   Final   • External Buprenorphine Screen Urine 02/08/2021 Positive   Final   • Buprenorphine Cut-Off 02/08/2021 10ng/ml   Final   • External MDMA 02/08/2021 Negative   Final   • MDMA Cut-Off 02/08/2021 500ng/ml    Final   • External Opiates Screen Urine 02/08/2021 Negative   Final   • Opiates Cut-Off 02/08/2021 300ng/ml   Final       Assessment/Plan   Diagnoses and all orders for this visit:    1. JUAN LUIS (generalized anxiety disorder) (Primary)  -     clonazePAM (KlonoPIN) 0.5 MG tablet; Take 1 tablet by mouth 2 (Two) Times a Day As Needed for Anxiety.  Dispense: 60 tablet; Refill: 2    2. Panic disorder with agoraphobia  -     clonazePAM (KlonoPIN) 0.5 MG tablet; Take 1 tablet by mouth 2 (Two) Times a Day As Needed for Anxiety.  Dispense: 60 tablet; Refill: 2    3. Social anxiety disorder  -     clonazePAM (KlonoPIN) 0.5 MG tablet; Take 1 tablet by mouth 2 (Two) Times a Day As Needed for Anxiety.  Dispense: 60 tablet; Refill: 2    4. Psychophysiological insomnia  -     clonazePAM (KlonoPIN) 0.5 MG tablet; Take 1 tablet by mouth 2 (Two) Times a Day As Needed for Anxiety.  Dispense: 60 tablet; Refill: 2    5. Opioid use disorder, severe, in sustained remission, on maintenance therapy (CMS/[facility])    6. Nicotine dependence, chewing tobacco, uncomplicated    -Patient continues to do relatively well.  His afternoon anxiety has improved with slightly increased dose of clonazepam but he is having some continued difficulty in the late evening which prohibits him from doing much of anything including talking to others or participating in activities of daily living due to the extreme anxiety and his response of shutting down.  He is not having any sedation or falls.  He has not had any major panic attacks.  He continues to use chewing tobacco and is on opioid maintenance therapy.  Insomnia has improved.  -Reviewed previous available documentation  -Reviewed previous medical records and scanned to chart  -Reviewed most recent available labs  -MICHEAL reviewed and appropriate. Patient counseled on use of controlled substances.   -Continue Seroquel 50 mg p.o. nightly for insomnia.  Patient gets this from an outside provider  -Increase  clonazepam 0.5 mg p.o. daily and 0.25 mg p.o. in the afternoon to 0.5 mg p.o. twice daily as needed for anxiety.  Patient advised of the risks of using benzodiazepines with concurrent opioid maintenance therapy.  In weighing the risks and benefits, patient is more active and functional during the day and is also taking an active role in his day-to-day life which is a functional improvement over before.  We will continue the medication for now.  The eventual goal will be to taper off of the medication completely or have patient taper off of his Suboxone, however he has been on benzodiazepines for an extended amount of time and this may prove to be difficult. Patient will be maintained on lowest dose possible due to these issues and interactions.   -Encouraged to continue maintenance therapy of Suboxone 16 mg sublingually daily for opioid use disorder.  Eventual goal is to taper off of her medication  -Encourage cessation of tobacco products  -Encouraged patient to consider therapy    Visit Diagnoses:    ICD-10-CM ICD-9-CM   1. JUAN LUIS (generalized anxiety disorder)  F41.1 300.02   2. Panic disorder with agoraphobia  F40.01 300.21   3. Social anxiety disorder  F40.10 300.23   4. Psychophysiological insomnia  F51.04 307.42   5. Opioid use disorder, severe, in sustained remission, on maintenance therapy (CMS/[facility])  F11.21 305.53   6. Nicotine dependence, chewing tobacco, uncomplicated  F17.220 305.1       TREATMENT PLAN/GOALS: Continue supportive psychotherapy efforts and medications as indicated. Treatment and medication options discussed during today's visit. Patient acknowledged and verbally consented to continue with current treatment plan and was educated on the importance of compliance with treatment and follow-up appointments.    MEDICATION ISSUES:    Discussed medication options and treatment plan of prescribed medication as well as the risks, benefits, and side effects including potential falls, possible impaired  driving and metabolic adversities among others. Patient is agreeable to call the office with any worsening of symptoms or onset of side effects. Patient is agreeable to call 911 or go to the nearest ER should he/she begin having SI/HI.     MEDS ORDERED DURING VISIT:  New Medications Ordered This Visit   Medications   • clonazePAM (KlonoPIN) 0.5 MG tablet     Sig: Take 1 tablet by mouth 2 (Two) Times a Day As Needed for Anxiety.     Dispense:  60 tablet     Refill:  2       Return in about 3 months (around 10/12/2021).             This document has been electronically signed by Yanick Coon MD  July 12, 2021 11:41 EDT

## 2021-10-04 ENCOUNTER — TELEMEDICINE (OUTPATIENT)
Dept: PSYCHIATRY | Facility: CLINIC | Age: 56
End: 2021-10-04

## 2021-10-04 DIAGNOSIS — F11.21 OPIOID USE DISORDER, SEVERE, IN SUSTAINED REMISSION, ON MAINTENANCE THERAPY (HCC): ICD-10-CM

## 2021-10-04 DIAGNOSIS — F40.01 PANIC DISORDER WITH AGORAPHOBIA: ICD-10-CM

## 2021-10-04 DIAGNOSIS — F17.220 NICOTINE DEPENDENCE, CHEWING TOBACCO, UNCOMPLICATED: ICD-10-CM

## 2021-10-04 DIAGNOSIS — F41.1 GAD (GENERALIZED ANXIETY DISORDER): Primary | ICD-10-CM

## 2021-10-04 DIAGNOSIS — F40.10 SOCIAL ANXIETY DISORDER: ICD-10-CM

## 2021-10-04 DIAGNOSIS — F51.04 PSYCHOPHYSIOLOGICAL INSOMNIA: ICD-10-CM

## 2021-10-04 PROCEDURE — 99214 OFFICE O/P EST MOD 30 MIN: CPT | Performed by: PSYCHIATRY & NEUROLOGY

## 2021-10-04 RX ORDER — CLONAZEPAM 0.5 MG/1
0.5 TABLET ORAL 2 TIMES DAILY PRN
Qty: 60 TABLET | Refills: 2 | Status: SHIPPED | OUTPATIENT
Start: 2021-10-04 | End: 2021-12-21 | Stop reason: SDUPTHER

## 2021-10-05 RX ORDER — QUETIAPINE FUMARATE 50 MG/1
50 TABLET, FILM COATED ORAL NIGHTLY
Qty: 90 TABLET | Refills: 0 | Status: SHIPPED | OUTPATIENT
Start: 2021-10-05 | End: 2021-12-21 | Stop reason: SDUPTHER

## 2021-10-05 NOTE — PROGRESS NOTES
Subjective   Macario Aguilar is a 56 y.o. male who presents today for follow up    Chief Complaint: Panic disorder, social anxiety, anxiety    This provider is located at [facility], [address] [place] [address]. The Patient is seen remotely at home, using Epic Mychart. Patient is being seen via telehealth and stated they are in a secure environment for this session. The patient’s condition being diagnosed/treated is appropriate for telemedicine. The provider identified himself as well as his credentials.   The patient gave consent to be seen remotely, and when consent is given they understand that the consent allows for patient identifiable information to be sent to a third party as needed.   They may refuse to be seen remotely at any time. The electronic data is encrypted and password protected, and the patient has been advised of the potential risks to privacy not withstanding such measures      History of Present Illness: Patient reports that he continues to do relatively well with mood and anxiety symptoms.  He continues to be more active in his day-to-day life with the aid of clonazepam for anxiety.  Patient is struggling somewhat currently as he has an upper respiratory infection with coughing, sneezing, sinus pressure, fever, fatigue, congestion.  He reports that he was COVID negative.  He denies any current medication side effects.  He denies SI/HI/AVH.    The following portions of the patient's history were reviewed and updated as appropriate: allergies, current medications, past family history, past medical history, past social history, past surgical history and problem list.      Past Medical History:  Past Medical History:   Diagnosis Date   • Anxiety        Social History:  Social History     Socioeconomic History   • Marital status:      Spouse name: Not on file   • Number of children: Not on file   • Years of education: Not on file   • Highest education level: Not on file   Tobacco  Use   • Smoking status: Never Smoker   • Smokeless tobacco: Current User     Types: Chew   Vaping Use   • Vaping Use: Never used   Substance and Sexual Activity   • Alcohol use: Never   • Drug use: Never   • Sexual activity: Defer       Family History:  Family History   Problem Relation Age of Onset   • Anxiety disorder Mother    • No Known Problems Father    • Anxiety disorder Maternal Uncle        Past Surgical History:  No past surgical history on file.    Problem List:  There is no problem list on file for this patient.      Allergy:   Allergies   Allergen Reactions   • Demerol [Meperidine] Hives   • Penicillins Hives        Current Medications:   Current Outpatient Medications   Medication Sig Dispense Refill   • albuterol sulfate  (90 Base) MCG/ACT inhaler      • amLODIPine (NORVASC) 5 MG tablet TAKE 1 TABLET BY MOUTH EVERY DAY FOR BLOOD PRESSURE. DOSE INCREASE.     • buprenorphine-naloxone (SUBOXONE) 8-2 MG film film Place  under the tongue Daily.     • clonazePAM (KlonoPIN) 0.5 MG tablet Take 1 tablet by mouth 2 (Two) Times a Day As Needed for Anxiety. 60 tablet 2   • gabapentin (NEURONTIN) 800 MG tablet      • latanoprost (XALATAN) 0.005 % ophthalmic solution Administer 1 drop to both eyes every night at bedtime.     • lisinopril (PRINIVIL,ZESTRIL) 40 MG tablet      • Narcan 4 MG/0.1ML nasal spray      • ondansetron (ZOFRAN) 4 MG tablet      • QUEtiapine (SEROquel) 50 MG tablet      • triamcinolone (KENALOG) 0.1 % cream      • Triamcinolone Acetonide 0.025 % lotion APPLY SPARINGLY TO AFFECTED AREA(S) DAILY       No current facility-administered medications for this visit.       Review of Symptoms:    Review of Systems   Constitutional: Positive for activity change (improved), fatigue and fever. Negative for chills, diaphoresis and unexpected weight loss.   HENT: Positive for congestion, rhinorrhea, sinus pressure and sneezing.    Eyes: Negative.    Respiratory: Positive for cough and shortness of  breath.    Cardiovascular: Positive for palpitations. Negative for chest pain and leg swelling.   Gastrointestinal: Negative.    Endocrine: Negative.    Genitourinary: Negative.    Musculoskeletal: Positive for arthralgias, back pain and myalgias.   Skin: Negative.    Allergic/Immunologic: Negative.    Neurological: Positive for tremors.   Hematological: Negative.    Psychiatric/Behavioral: Negative for stress. The patient is not nervous/anxious.          Physical Exam:   There were no vitals taken for this visit.  Video visit    Appearance:  male of stated age in no acute distress, NAD  Gait, Station, Strength: Video visit    Mental Status Exam:     Mental Status exam performed 10/04/2021  and patient shows no significant changes from previous exam.     Hygiene:   good  Cooperation:  Cooperative  Eye Contact:  Good  Psychomotor Behavior:  Appropriate  Affect:  Full range  Mood: normal  Hopelessness: Optimistic  Speech:  Normal  Thought Process:  Goal directed and Linear  Thought Content:  Normal and Mood congruent  Suicidal:  None  Homicidal:  None  Hallucinations:  None  Delusion:  None  Memory:  Intact  Orientation:  Person, Place, Time and Situation  Reliability:  good  Insight:  Good  Judgement:  Good  Impulse Control:  Good  Physical/Medical Issues:  Yes chronic pain from previous work related accident       Lab Results:   No visits with results within 1 Month(s) from this visit.   Latest known visit with results is:   Office Visit on 02/08/2021   Component Date Value Ref Range Status   • External Amphetamine Screen Urine 02/08/2021 Negative   Final   • Amphetamine Cut-Off 02/08/2021 1000ng/ml   Final   • External Benzodiazepine Screen Uri* 02/08/2021 Negative   Final   • Benzodiazipine Cut-Off 02/08/2021 300ng/ml   Final   • External Cocaine Screen Urine 02/08/2021 Negative   Final   • Cocaine Cut-Off 02/08/2021 300ng/ml   Final   • External THC Screen Urine 02/08/2021 Negative   Final   • THC  Cut-Off 02/08/2021 50ng/ml   Final   • External Methadone Screen Urine 02/08/2021 Negative   Final   • Methadone Cut-Off 02/08/2021 300ng/ml   Final   • External Methamphetamine Screen Ur* 02/08/2021 Negative   Final   • Methamphetamine Cut-Off 02/08/2021 1000ng/ml   Final   • External Oxycodone Screen Urine 02/08/2021 Negative   Final   • Oxycodone Cut-Off 02/08/2021 100ng/ml   Final   • External Buprenorphine Screen Urine 02/08/2021 Positive   Final   • Buprenorphine Cut-Off 02/08/2021 10ng/ml   Final   • External MDMA 02/08/2021 Negative   Final   • MDMA Cut-Off 02/08/2021 500ng/ml   Final   • External Opiates Screen Urine 02/08/2021 Negative   Final   • Opiates Cut-Off 02/08/2021 300ng/ml   Final       Assessment/Plan   Diagnoses and all orders for this visit:    1. JUAN LUIS (generalized anxiety disorder) (Primary)  -     clonazePAM (KlonoPIN) 0.5 MG tablet; Take 1 tablet by mouth 2 (Two) Times a Day As Needed for Anxiety.  Dispense: 60 tablet; Refill: 2  -     QUEtiapine (SEROquel) 50 MG tablet; Take 1 tablet by mouth Every Night.  Dispense: 90 tablet; Refill: 0    2. Panic disorder with agoraphobia  -     clonazePAM (KlonoPIN) 0.5 MG tablet; Take 1 tablet by mouth 2 (Two) Times a Day As Needed for Anxiety.  Dispense: 60 tablet; Refill: 2    3. Social anxiety disorder  -     clonazePAM (KlonoPIN) 0.5 MG tablet; Take 1 tablet by mouth 2 (Two) Times a Day As Needed for Anxiety.  Dispense: 60 tablet; Refill: 2    4. Psychophysiological insomnia  -     clonazePAM (KlonoPIN) 0.5 MG tablet; Take 1 tablet by mouth 2 (Two) Times a Day As Needed for Anxiety.  Dispense: 60 tablet; Refill: 2  -     QUEtiapine (SEROquel) 50 MG tablet; Take 1 tablet by mouth Every Night.  Dispense: 90 tablet; Refill: 0    5. Opioid use disorder, severe, in sustained remission, on maintenance therapy (HCC)    6. Nicotine dependence, chewing tobacco, uncomplicated    -Patient continues to do relatively well from a mental health standpoint.  He is  having some upper respiratory symptoms but his anxiety and mood symptoms are well controlled.  He has not had any sedation or falls.  -Reviewed previous available documentation  -Reviewed previous medical records and scanned to chart  -Reviewed most recent available labs  -MICHEAL reviewed and appropriate. Patient counseled on use of controlled substances.   -Continue Seroquel 50 mg p.o. nightly for insomnia.  Patient gets this from an outside provider  -Continue clonazepam 0.5 mg p.o. twice daily as needed for anxiety.  Patient advised of the risks of using benzodiazepines with concurrent opioid maintenance therapy.  In weighing the risks and benefits, patient is more active and functional during the day and is also taking an active role in his day-to-day life which is a functional improvement over before.  We will continue the medication for now.  The eventual goal will be to taper off of the medication completely or have patient taper off of his Suboxone, however he has been on benzodiazepines for an extended amount of time and this may prove to be difficult. Patient will be maintained on lowest dose possible due to these issues and interactions.   -Encouraged to continue maintenance therapy of Suboxone 16 mg sublingually daily for opioid use disorder.  Eventual goal is to taper off of her medication  -Encourage cessation of tobacco products  -Encouraged patient to consider therapy  -Approximate appointment time 11:20 AM to 11:45 AM via video visit    Visit Diagnoses:    ICD-10-CM ICD-9-CM   1. JUAN LUIS (generalized anxiety disorder)  F41.1 300.02   2. Panic disorder with agoraphobia  F40.01 300.21   3. Social anxiety disorder  F40.10 300.23   4. Psychophysiological insomnia  F51.04 307.42   5. Opioid use disorder, severe, in sustained remission, on maintenance therapy ([facility])  F11.21 305.53   6. Nicotine dependence, chewing tobacco, uncomplicated  F17.220 305.1       TREATMENT PLAN/GOALS: Continue supportive  psychotherapy efforts and medications as indicated. Treatment and medication options discussed during today's visit. Patient acknowledged and verbally consented to continue with current treatment plan and was educated on the importance of compliance with treatment and follow-up appointments.    MEDICATION ISSUES:    Discussed medication options and treatment plan of prescribed medication as well as the risks, benefits, and side effects including potential falls, possible impaired driving and metabolic adversities among others. Patient is agreeable to call the office with any worsening of symptoms or onset of side effects. Patient is agreeable to call 911 or go to the nearest ER should he/she begin having SI/HI.     MEDS ORDERED DURING VISIT:  New Medications Ordered This Visit   Medications   • clonazePAM (KlonoPIN) 0.5 MG tablet     Sig: Take 1 tablet by mouth 2 (Two) Times a Day As Needed for Anxiety.     Dispense:  60 tablet     Refill:  2   • QUEtiapine (SEROquel) 50 MG tablet     Sig: Take 1 tablet by mouth Every Night.     Dispense:  90 tablet     Refill:  0       Return in about 3 months (around 1/4/2022).             This document has been electronically signed by Yanick Coon MD  October 5, 2021 09:07 EDT

## 2021-12-21 ENCOUNTER — TELEMEDICINE (OUTPATIENT)
Dept: PSYCHIATRY | Facility: CLINIC | Age: 56
End: 2021-12-21

## 2021-12-21 VITALS
HEIGHT: 72 IN | SYSTOLIC BLOOD PRESSURE: 120 MMHG | DIASTOLIC BLOOD PRESSURE: 62 MMHG | WEIGHT: 206 LBS | BODY MASS INDEX: 27.9 KG/M2

## 2021-12-21 DIAGNOSIS — F51.04 PSYCHOPHYSIOLOGICAL INSOMNIA: ICD-10-CM

## 2021-12-21 DIAGNOSIS — F41.1 GAD (GENERALIZED ANXIETY DISORDER): Primary | ICD-10-CM

## 2021-12-21 DIAGNOSIS — Z79.899 MEDICATION MANAGEMENT: ICD-10-CM

## 2021-12-21 DIAGNOSIS — F40.01 PANIC DISORDER WITH AGORAPHOBIA: ICD-10-CM

## 2021-12-21 DIAGNOSIS — F40.10 SOCIAL ANXIETY DISORDER: ICD-10-CM

## 2021-12-21 LAB
AMPHETAMINE CUT-OFF: NORMAL
BENZODIAZIPINE CUT-OFF: NORMAL
BUPRENORPHINE CUT-OFF: NORMAL
COCAINE CUT-OFF: NORMAL
EXTERNAL AMPHETAMINE SCREEN URINE: NEGATIVE
EXTERNAL BENZODIAZEPINE SCREEN URINE: NEGATIVE
EXTERNAL BUPRENORPHINE SCREEN URINE: NEGATIVE
EXTERNAL COCAINE SCREEN URINE: NEGATIVE
EXTERNAL MDMA: NEGATIVE
EXTERNAL METHADONE SCREEN URINE: NEGATIVE
EXTERNAL METHAMPHETAMINE SCREEN URINE: NEGATIVE
EXTERNAL OPIATES SCREEN URINE: NEGATIVE
EXTERNAL OXYCODONE SCREEN URINE: NEGATIVE
EXTERNAL THC SCREEN URINE: NEGATIVE
MDMA CUT-OFF: NORMAL
METHADONE CUT-OFF: NORMAL
METHAMPHETAMINE CUT-OFF: NORMAL
OPIATES CUT-OFF: NORMAL
OXYCODONE CUT-OFF: NORMAL
THC CUT-OFF: NORMAL

## 2021-12-21 PROCEDURE — 99214 OFFICE O/P EST MOD 30 MIN: CPT | Performed by: PSYCHIATRY & NEUROLOGY

## 2021-12-21 RX ORDER — PEN NEEDLE, DIABETIC 32GX 5/32"
1 NEEDLE, DISPOSABLE MISCELLANEOUS DAILY
COMMUNITY
Start: 2021-11-29

## 2021-12-21 RX ORDER — PEN NEEDLE, DIABETIC 32GX 5/32"
NEEDLE, DISPOSABLE MISCELLANEOUS
COMMUNITY
Start: 2021-12-13

## 2021-12-21 RX ORDER — CLONAZEPAM 0.5 MG/1
0.5 TABLET ORAL 3 TIMES DAILY PRN
Qty: 90 TABLET | Refills: 2 | Status: SHIPPED | OUTPATIENT
Start: 2021-12-21 | End: 2022-03-10 | Stop reason: SDUPTHER

## 2021-12-21 RX ORDER — QUETIAPINE FUMARATE 50 MG/1
75 TABLET, FILM COATED ORAL NIGHTLY
Qty: 90 TABLET | Refills: 0 | Status: SHIPPED | OUTPATIENT
Start: 2021-12-21 | End: 2022-02-07 | Stop reason: SDUPTHER

## 2021-12-21 RX ORDER — INSULIN GLARGINE 100 [IU]/ML
INJECTION, SOLUTION SUBCUTANEOUS
COMMUNITY
Start: 2021-12-13

## 2021-12-22 NOTE — PROGRESS NOTES
Subjective   Macario Aguilar is a 56 y.o. male who presents today for follow up    Chief Complaint: Panic disorder, social anxiety, anxiety      History of Present Illness: Patient having some worsening anxiety symptoms.  He seems to be having some anterior dosing with symptoms with higher anxiety in the middle of the day with panic-like symptoms with palpitations, chest tightness, diaphoresis, and irritability and agitation with feeling overwhelmed and anxious.  He is not having any medication side effects.  He does report that he was recently diagnosed with diabetes and has had to make some lifestyle adjustments including changing his diet and discontinuing his use of Coca-Cola which he was drinking all day long.  He has successfully made changes and has his blood sugar under much better control.  He has not had any sedation or falls related to medications.  Again we discussed the risk of concurrent benzodiazepine and opioid use including respiratory suppression and death.  Patient advised to monitor for symptoms.  No concern for noncompliance or diversion.  Patient denies SI/HI/AVH.    The following portions of the patient's history were reviewed and updated as appropriate: allergies, current medications, past family history, past medical history, past social history, past surgical history and problem list.      Past Medical History:  Past Medical History:   Diagnosis Date   • Anxiety        Social History:  Social History     Socioeconomic History   • Marital status:    Tobacco Use   • Smoking status: Never Smoker   • Smokeless tobacco: Current User     Types: Chew   Vaping Use   • Vaping Use: Never used   Substance and Sexual Activity   • Alcohol use: Never   • Drug use: Never   • Sexual activity: Defer       Family History:  Family History   Problem Relation Age of Onset   • Anxiety disorder Mother    • No Known Problems Father    • Anxiety disorder Maternal Uncle        Past Surgical History:  History  reviewed. No pertinent surgical history.    Problem List:  There is no problem list on file for this patient.      Allergy:   Allergies   Allergen Reactions   • Demerol [Meperidine] Hives   • Penicillins Hives        Current Medications:   Current Outpatient Medications   Medication Sig Dispense Refill   • albuterol sulfate  (90 Base) MCG/ACT inhaler      • amLODIPine (NORVASC) 5 MG tablet TAKE 1 TABLET BY MOUTH EVERY DAY FOR BLOOD PRESSURE. DOSE INCREASE.     • buprenorphine-naloxone (SUBOXONE) 8-2 MG film film Place  under the tongue Daily.     • clonazePAM (KlonoPIN) 0.5 MG tablet Take 1 tablet by mouth 3 (Three) Times a Day As Needed for Anxiety. 90 tablet 2   • gabapentin (NEURONTIN) 800 MG tablet      • latanoprost (XALATAN) 0.005 % ophthalmic solution Administer 1 drop to both eyes every night at bedtime.     • lisinopril (PRINIVIL,ZESTRIL) 40 MG tablet      • Narcan 4 MG/0.1ML nasal spray      • ondansetron (ZOFRAN) 4 MG tablet      • QUEtiapine (SEROquel) 50 MG tablet Take 1.5 tablets by mouth Every Night. 90 tablet 0   • triamcinolone (KENALOG) 0.1 % cream      • Triamcinolone Acetonide 0.025 % lotion APPLY SPARINGLY TO AFFECTED AREA(S) DAILY     • BD Pen Needle Delfina U/F 32G X 4 MM misc 1 each by Other route Daily.     • Lantus 100 UNIT/ML injection INJECT 10 UNITS SUBCUTANEOUSLY EVERY NIGHT AT BEDTIME     • Pro Comfort Lancets 31G misc USE TO CHECK BLOOD SUGAR LEVEL THREE TIMES A DAY     • triamcinolone (KENALOG) 0.1 % ointment APPLY SPARINGLY TO AFFECTED AREA(S) TWO TIMES A DAY       No current facility-administered medications for this visit.       Review of Symptoms:    Review of Systems   Constitutional: Positive for activity change (improved) and fatigue. Negative for chills, diaphoresis, fever and unexpected weight loss.   HENT: Negative for congestion, rhinorrhea, sinus pressure and sneezing.    Eyes: Negative.    Respiratory: Positive for chest tightness. Negative for cough and shortness of  "breath.    Cardiovascular: Positive for palpitations. Negative for chest pain and leg swelling.   Gastrointestinal: Negative.    Endocrine: Negative.    Genitourinary: Negative.    Musculoskeletal: Positive for arthralgias, back pain and myalgias.   Skin: Negative.    Allergic/Immunologic: Negative.    Neurological: Positive for tremors.   Hematological: Negative.    Psychiatric/Behavioral: Negative for stress. The patient is nervous/anxious.          Physical Exam:   Blood pressure 120/62, height 182.9 cm (72.01\"), weight 93.4 kg (206 lb).      Appearance:  male of stated age in no acute distress, NAD  Gait, Station, Strength: CM of stated age, NAD     Mental Status Exam:     Hygiene:   good  Cooperation:  Cooperative  Eye Contact:  Good  Psychomotor Behavior:  Appropriate  Affect:  Restricted  Mood: anxious, worsening  Hopelessness: Optimistic  Speech:  Normal  Thought Process:  Goal directed and Linear  Thought Content:  Normal and Mood congruent  Suicidal:  None  Homicidal:  None  Hallucinations:  None  Delusion:  None  Memory:  Intact  Orientation:  Person, Place, Time and Situation  Reliability:  good  Insight:  Good  Judgement:  Good  Impulse Control:  Good  Physical/Medical Issues:  Yes chronic pain from previous work related accident       Lab Results:   Telemedicine on 12/21/2021   Component Date Value Ref Range Status   • External Amphetamine Screen Urine 12/21/2021 Negative   Final   • Amphetamine Cut-Off 12/21/2021 1000ng/ml   Final   • External Benzodiazepine Screen Uri* 12/21/2021 Negative   Final   • Benzodiazipine Cut-Off 12/21/2021 300ng/ml   Final   • External Cocaine Screen Urine 12/21/2021 Negative   Final   • Cocaine Cut-Off 12/21/2021 300ng/ml   Final   • External THC Screen Urine 12/21/2021 Negative   Final   • THC Cut-Off 12/21/2021 50ng/ml   Final   • External Methadone Screen Urine 12/21/2021 Negative   Final   • Methadone Cut-Off 12/21/2021 300ng/ml   Final   • External " Methamphetamine Screen Ur* 12/21/2021 Negative   Final   • Methamphetamine Cut-Off 12/21/2021 1000ng/ml   Final   • External Oxycodone Screen Urine 12/21/2021 Negative   Final   • Oxycodone Cut-Off 12/21/2021 100ng/ml   Final   • External Buprenorphine Screen Urine 12/21/2021 Negative   Final   • Buprenorphine Cut-Off 12/21/2021 10ng/ml   Final   • External MDMA 12/21/2021 Negative   Final   • MDMA Cut-Off 12/21/2021 500ng/ml   Final   • External Opiates Screen Urine 12/21/2021 Negative   Final   • Opiates Cut-Off 12/21/2021 300ng/ml   Final       Assessment/Plan   Diagnoses and all orders for this visit:    1. JUAN LUIS (generalized anxiety disorder) (Primary)  -     clonazePAM (KlonoPIN) 0.5 MG tablet; Take 1 tablet by mouth 3 (Three) Times a Day As Needed for Anxiety.  Dispense: 90 tablet; Refill: 2  -     QUEtiapine (SEROquel) 50 MG tablet; Take 1.5 tablets by mouth Every Night.  Dispense: 90 tablet; Refill: 0    2. Medication management  -     [facility] Drug Screen    3. Panic disorder with agoraphobia  -     clonazePAM (KlonoPIN) 0.5 MG tablet; Take 1 tablet by mouth 3 (Three) Times a Day As Needed for Anxiety.  Dispense: 90 tablet; Refill: 2    4. Social anxiety disorder  -     clonazePAM (KlonoPIN) 0.5 MG tablet; Take 1 tablet by mouth 3 (Three) Times a Day As Needed for Anxiety.  Dispense: 90 tablet; Refill: 2    5. Psychophysiological insomnia  -     clonazePAM (KlonoPIN) 0.5 MG tablet; Take 1 tablet by mouth 3 (Three) Times a Day As Needed for Anxiety.  Dispense: 90 tablet; Refill: 2  -     QUEtiapine (SEROquel) 50 MG tablet; Take 1.5 tablets by mouth Every Night.  Dispense: 90 tablet; Refill: 0    -Patient having some worsening anxiety symptoms that seem to peak in the middle of the day in between his doses of clonazepam.  Patient likely having anterior dosing withdrawal symptoms.  He is not having any sedation or falls related to medication use.  He has had some increased frequency of panic attacks  recently.  -Reviewed previous available documentation  -Reviewed previous medical records and scanned to chart  -Reviewed most recent available labs  -MICHEAL reviewed and appropriate. Patient counseled on use of controlled substances.   -Continue Seroquel 50 mg p.o. nightly for insomnia.  Patient gets this from an outside provider  -Increase clonazepam 0.5 mg p.o. twice daily to 3 times daily as needed for anxiety.  Patient advised of the risks of using benzodiazepines with concurrent opioid maintenance therapy.  In weighing the risks and benefits, patient is more active and functional during the day and is also taking an active role in his day-to-day life which is a functional improvement over before.  We will continue the medication for now.  The eventual goal will be to taper off of the medication completely or have patient taper off of his Suboxone, however he has been on benzodiazepines for an extended amount of time and this may prove to be difficult. Patient will be maintained on lowest dose possible due to these issues and interactions.   -Encouraged to continue maintenance therapy of Suboxone 16 mg sublingually daily for opioid use disorder.  Eventual goal is to taper off of her medication  -Encourage cessation of tobacco products  -Encouraged patient to consider therapy      Visit Diagnoses:    ICD-10-CM ICD-9-CM   1. JUAN LUIS (generalized anxiety disorder)  F41.1 300.02   2. Medication management  Z79.899 V58.69   3. Panic disorder with agoraphobia  F40.01 300.21   4. Social anxiety disorder  F40.10 300.23   5. Psychophysiological insomnia  F51.04 307.42       TREATMENT PLAN/GOALS: Continue supportive psychotherapy efforts and medications as indicated. Treatment and medication options discussed during today's visit. Patient acknowledged and verbally consented to continue with current treatment plan and was educated on the importance of compliance with treatment and follow-up appointments.    MEDICATION  ISSUES:    Discussed medication options and treatment plan of prescribed medication as well as the risks, benefits, and side effects including potential falls, possible impaired driving and metabolic adversities among others. Patient is agreeable to call the office with any worsening of symptoms or onset of side effects. Patient is agreeable to call 911 or go to the nearest ER should he/she begin having SI/HI.     MEDS ORDERED DURING VISIT:  New Medications Ordered This Visit   Medications   • clonazePAM (KlonoPIN) 0.5 MG tablet     Sig: Take 1 tablet by mouth 3 (Three) Times a Day As Needed for Anxiety.     Dispense:  90 tablet     Refill:  2   • QUEtiapine (SEROquel) 50 MG tablet     Sig: Take 1.5 tablets by mouth Every Night.     Dispense:  90 tablet     Refill:  0       Return in about 3 months (around 3/21/2022).             This document has been electronically signed by Yanick Coon MD  December 22, 2021 12:14 EST

## 2022-02-05 DIAGNOSIS — F51.04 PSYCHOPHYSIOLOGICAL INSOMNIA: ICD-10-CM

## 2022-02-05 DIAGNOSIS — F41.1 GAD (GENERALIZED ANXIETY DISORDER): ICD-10-CM

## 2022-02-07 ENCOUNTER — TELEPHONE (OUTPATIENT)
Dept: PSYCHIATRY | Facility: CLINIC | Age: 57
End: 2022-02-07

## 2022-02-07 DIAGNOSIS — F51.04 PSYCHOPHYSIOLOGICAL INSOMNIA: ICD-10-CM

## 2022-02-07 DIAGNOSIS — F41.1 GAD (GENERALIZED ANXIETY DISORDER): ICD-10-CM

## 2022-02-07 RX ORDER — QUETIAPINE FUMARATE 50 MG/1
TABLET, FILM COATED ORAL
Qty: 90 TABLET | Refills: 0 | OUTPATIENT
Start: 2022-02-07

## 2022-02-07 RX ORDER — QUETIAPINE FUMARATE 50 MG/1
75 TABLET, FILM COATED ORAL NIGHTLY
Qty: 90 TABLET | Refills: 0 | Status: SHIPPED | OUTPATIENT
Start: 2022-02-07 | End: 2022-04-11

## 2022-03-10 ENCOUNTER — OFFICE VISIT (OUTPATIENT)
Dept: PSYCHIATRY | Facility: CLINIC | Age: 57
End: 2022-03-10

## 2022-03-10 VITALS
BODY MASS INDEX: 29.53 KG/M2 | HEIGHT: 72 IN | HEART RATE: 79 BPM | SYSTOLIC BLOOD PRESSURE: 122 MMHG | DIASTOLIC BLOOD PRESSURE: 82 MMHG | WEIGHT: 218 LBS

## 2022-03-10 DIAGNOSIS — F51.04 PSYCHOPHYSIOLOGICAL INSOMNIA: ICD-10-CM

## 2022-03-10 DIAGNOSIS — F41.1 GAD (GENERALIZED ANXIETY DISORDER): ICD-10-CM

## 2022-03-10 DIAGNOSIS — F40.10 SOCIAL ANXIETY DISORDER: ICD-10-CM

## 2022-03-10 DIAGNOSIS — F40.01 PANIC DISORDER WITH AGORAPHOBIA: ICD-10-CM

## 2022-03-10 PROCEDURE — 99214 OFFICE O/P EST MOD 30 MIN: CPT | Performed by: PSYCHIATRY & NEUROLOGY

## 2022-03-10 RX ORDER — CLONAZEPAM 0.5 MG/1
0.5 TABLET ORAL 3 TIMES DAILY PRN
Qty: 90 TABLET | Refills: 2 | Status: SHIPPED | OUTPATIENT
Start: 2022-03-10 | End: 2022-05-19 | Stop reason: SDUPTHER
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
## 2022-03-27 ENCOUNTER — TELEMEDICINE (OUTPATIENT)
Dept: FAMILY MEDICINE CLINIC | Facility: TELEHEALTH | Age: 57
End: 2022-03-27

## 2022-03-27 DIAGNOSIS — B34.9 VIRAL ILLNESS: Primary | ICD-10-CM

## 2022-03-27 DIAGNOSIS — R06.2 WHEEZING: ICD-10-CM

## 2022-03-27 PROBLEM — E11.9 TYPE 2 DIABETES MELLITUS, WITHOUT LONG-TERM CURRENT USE OF INSULIN: Status: ACTIVE | Noted: 2022-03-27

## 2022-03-27 PROCEDURE — 99203 OFFICE O/P NEW LOW 30 MIN: CPT | Performed by: NURSE PRACTITIONER

## 2022-03-27 RX ORDER — DEXTROMETHORPHAN HYDROBROMIDE AND PROMETHAZINE HYDROCHLORIDE 15; 6.25 MG/5ML; MG/5ML
5 SYRUP ORAL 4 TIMES DAILY PRN
Qty: 118 ML | Refills: 0 | Status: SHIPPED | OUTPATIENT
Start: 2022-03-27 | End: 2023-01-23

## 2022-03-27 RX ORDER — ALBUTEROL SULFATE 90 UG/1
2 AEROSOL, METERED RESPIRATORY (INHALATION) EVERY 4 HOURS PRN
Qty: 18 G | Refills: 0 | Status: SHIPPED | OUTPATIENT
Start: 2022-03-27

## 2022-03-27 RX ORDER — ALBUTEROL SULFATE 90 UG/1
2 AEROSOL, METERED RESPIRATORY (INHALATION) EVERY 4 HOURS PRN
Qty: 18 G | Refills: 0 | Status: SHIPPED | OUTPATIENT
Start: 2022-03-27 | End: 2022-03-27

## 2022-03-27 RX ORDER — FLUTICASONE PROPIONATE 50 MCG
2 SPRAY, SUSPENSION (ML) NASAL DAILY
Qty: 18 G | Refills: 0 | Status: SHIPPED | OUTPATIENT
Start: 2022-03-27 | End: 2023-01-23

## 2022-03-27 NOTE — PROGRESS NOTES
You have chosen to receive care through a telehealth visit.  Do you consent to use a video/audio connection for your medical care today? Yes     CHIEF COMPLAINT  Cc:sore throat, cough, congestion     HPI  Macario Aguilar is a 56 y.o. male  presents with complaint of a sore throat, cough and nasal congestion. He also reports some wheezing. He woke up today with these symptoms. Additional symptoms are as noted in the ROS portion of this visit. He has tried cough drops, throat spray and Mucinex for his symptoms without relief. He reports that he used to have an albuterol inhaler but that he is now out of it. No known exposure to COVID or the flu that the patient is aware of at this time. He is fully vaccinated for COVID via two doses of the Pfizer vaccine. He did do a COVID test and the results of that test were negative.    Review of Systems   Constitutional: Negative for fatigue and fever.   HENT: Positive for congestion (yellow), postnasal drip, rhinorrhea, sinus pressure, sinus pain and sore throat. Negative for ear pain, sneezing and tinnitus.    Respiratory: Positive for cough and wheezing. Negative for chest tightness and shortness of breath.    Cardiovascular: Negative for chest pain.   Gastrointestinal: Negative for diarrhea, nausea and vomiting.   Musculoskeletal: Negative for myalgias.   Neurological: Positive for headaches (mild).       Past Medical History:   Diagnosis Date   • Anxiety        Family History   Problem Relation Age of Onset   • Anxiety disorder Mother    • No Known Problems Father    • Anxiety disorder Maternal Uncle        Social History     Socioeconomic History   • Marital status:    Tobacco Use   • Smoking status: Never Smoker   • Smokeless tobacco: Current User     Types: Chew   Vaping Use   • Vaping Use: Never used   Substance and Sexual Activity   • Alcohol use: Never   • Drug use: Never   • Sexual activity: Defer       Macario Aguilar  reports that he has never smoked. His  smokeless tobacco use includes chew.. I have educated him on the risk of diseases from using tobacco products such as cancer, COPD and heart disease.     I advised him to quit and he is not willing to quit.    I spent 5 minutes counseling the patient.       There were no vitals taken for this visit.    PHYSICAL EXAM  Physical Exam   Constitutional: He is oriented to person, place, and time. He appears well-developed and well-nourished.   HENT:   Head: Normocephalic and atraumatic.   Right Ear: External ear normal.   Left Ear: External ear normal.   Nose: Rhinorrhea present. Right sinus exhibits maxillary sinus tenderness (patient directed exam) and frontal sinus tenderness (patient directed exam). Left sinus exhibits maxillary sinus tenderness (patient directed exam) and frontal sinus tenderness (patient directed exam).   Mouth/Throat: Mucous membranes are erythematous.   Eyes: Lids are normal. Right eye exhibits no discharge and no exudate. Left eye exhibits no discharge and no exudate. Right conjunctiva is not injected. Left conjunctiva is not injected.   Pulmonary/Chest: No accessory muscle usage. No tachypnea and no bradypnea.  No respiratory distress (occasional cough noted at visit).No use of oxygen by nasal cannulaNo use of oxygen by mask noted.  Abdominal: Abdomen appears normal.   Neurological: He is alert and oriented to person, place, and time. No cranial nerve deficit.   Skin: His skin appears normal.  Psychiatric: He has a normal mood and affect. His speech is normal and behavior is normal. Judgment and thought content normal.       Results for orders placed or performed in visit on 12/21/21   [employer] Drug Screen    Specimen: Urine, Clean Catch   Result Value Ref Range    External Amphetamine Screen Urine Negative     Amphetamine Cut-Off 1000ng/ml     External Benzodiazepine Screen Urine Negative     Benzodiazipine Cut-Off 300ng/ml     External Cocaine Screen Urine Negative     Cocaine Cut-Off 300ng/ml      External THC Screen Urine Negative     THC Cut-Off 50ng/ml     External Methadone Screen Urine Negative     Methadone Cut-Off 300ng/ml     External Methamphetamine Screen Urine Negative     Methamphetamine Cut-Off 1000ng/ml     External Oxycodone Screen Urine Negative     Oxycodone Cut-Off 100ng/ml     External Buprenorphine Screen Urine Negative     Buprenorphine Cut-Off 10ng/ml     External MDMA Negative     MDMA Cut-Off 500ng/ml     External Opiates Screen Urine Negative     Opiates Cut-Off 300ng/ml        Diagnoses and all orders for this visit:    1. Viral illness (Primary)    Other orders  -     fluticasone (FLONASE) 50 MCG/ACT nasal spray; 2 sprays into the nostril(s) as directed by provider Daily. Administer 2 sprays in each nostril for each dose.  Dispense: 18 g; Refill: 0  -     promethazine-dextromethorphan (PROMETHAZINE-DM) 6.25-15 MG/5ML syrup; Take 5 mL by mouth 4 (Four) Times a Day As Needed for Cough.  Dispense: 118 mL; Refill: 0  -     Discontinue: albuterol sulfate  (90 Base) MCG/ACT inhaler; Inhale 2 puffs Every 4 (Four) Hours As Needed for Shortness of Air.  Dispense: 18 g; Refill: 0  -     albuterol sulfate  (90 Base) MCG/ACT inhaler; Inhale 2 puffs Every 4 (Four) Hours As Needed for Wheezing.  Dispense: 18 g; Refill: 0      Continue Mucinex with plenty of fluids especially water to thin secretions and help with congestion.  Use flonase daily as directed  Albuterol inhaler as needed and directed for wheezing  Do not drive after taking promethazine DM as it may make you drowsy    FOLLOW-UP  If symptoms worsen or persist follow up with PCP.Virtual Care or Urgent Care    Patient verbalizes understanding of medication dosage, comfort measures, instructions for treatment and follow-up.    Nena Canales, YOAV  03/27/2022  18:57 EDT    This visit was performed via Telehealth.  This patient has been instructed to follow-up with their primary care provider if their symptoms worsen or the  treatment provided does not resolve their illness.

## 2022-04-09 DIAGNOSIS — F41.1 GAD (GENERALIZED ANXIETY DISORDER): ICD-10-CM

## 2022-04-09 DIAGNOSIS — F51.04 PSYCHOPHYSIOLOGICAL INSOMNIA: ICD-10-CM

## 2022-04-11 RX ORDER — QUETIAPINE FUMARATE 50 MG/1
TABLET, FILM COATED ORAL
Qty: 90 TABLET | Refills: 0 | Status: SHIPPED | OUTPATIENT
Start: 2022-04-11 | End: 2022-05-19 | Stop reason: SDUPTHER

## 2022-05-03 ENCOUNTER — TELEPHONE (OUTPATIENT)
Dept: PSYCHIATRY | Facility: CLINIC | Age: 57
End: 2022-05-03
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
## 2022-05-19 ENCOUNTER — TELEPHONE (OUTPATIENT)
Dept: PSYCHIATRY | Facility: CLINIC | Age: 57
End: 2022-05-19

## 2022-05-19 DIAGNOSIS — F40.01 PANIC DISORDER WITH AGORAPHOBIA: ICD-10-CM

## 2022-05-19 DIAGNOSIS — F40.10 SOCIAL ANXIETY DISORDER: ICD-10-CM

## 2022-05-19 DIAGNOSIS — F41.1 GAD (GENERALIZED ANXIETY DISORDER): ICD-10-CM

## 2022-05-19 DIAGNOSIS — F51.04 PSYCHOPHYSIOLOGICAL INSOMNIA: ICD-10-CM

## 2022-05-19 RX ORDER — QUETIAPINE FUMARATE 50 MG/1
75 TABLET, FILM COATED ORAL NIGHTLY
Qty: 90 TABLET | Refills: 0 | Status: SHIPPED | OUTPATIENT
Start: 2022-05-19 | End: 2022-07-08

## 2022-05-19 RX ORDER — CLONAZEPAM 0.5 MG/1
0.5 TABLET ORAL 3 TIMES DAILY PRN
Qty: 90 TABLET | Refills: 2 | Status: SHIPPED | OUTPATIENT
Start: 2022-05-19 | End: 2022-09-06 | Stop reason: SDUPTHER

## 2022-06-02 ENCOUNTER — OFFICE VISIT (OUTPATIENT)
Dept: PSYCHIATRY | Facility: CLINIC | Age: 57
End: 2022-06-02

## 2022-06-02 VITALS
BODY MASS INDEX: 30.85 KG/M2 | HEART RATE: 79 BPM | WEIGHT: 227.8 LBS | HEIGHT: 72 IN | SYSTOLIC BLOOD PRESSURE: 122 MMHG | DIASTOLIC BLOOD PRESSURE: 78 MMHG

## 2022-06-02 DIAGNOSIS — F41.1 GAD (GENERALIZED ANXIETY DISORDER): Primary | ICD-10-CM

## 2022-06-02 DIAGNOSIS — F11.21 OPIOID USE DISORDER, SEVERE, IN SUSTAINED REMISSION, ON MAINTENANCE THERAPY (HCC): ICD-10-CM

## 2022-06-02 DIAGNOSIS — F17.220 NICOTINE DEPENDENCE, CHEWING TOBACCO, UNCOMPLICATED: ICD-10-CM

## 2022-06-02 DIAGNOSIS — F40.10 SOCIAL ANXIETY DISORDER: ICD-10-CM

## 2022-06-02 DIAGNOSIS — F40.01 PANIC DISORDER WITH AGORAPHOBIA: ICD-10-CM

## 2022-06-02 DIAGNOSIS — F51.04 PSYCHOPHYSIOLOGICAL INSOMNIA: ICD-10-CM

## 2022-06-02 PROCEDURE — 99214 OFFICE O/P EST MOD 30 MIN: CPT | Performed by: PSYCHIATRY & NEUROLOGY

## 2022-06-02 NOTE — PROGRESS NOTES
Subjective   Macario Aguilar is a 56 y.o. male who presents today for follow up    Chief Complaint: Panic disorder, social anxiety, anxiety      History of Present Illness: Patient presents today for follow-up.  Since last visit, he has had some increased life stressors.  His daughter lost her [employer] benefits and moved in with him along with her 2 children.  It has been stressful as patient does need some peace and quiet and he gets overwhelmed and nervous easily and has had some more panic attacks with palpitations.  This was increased when he found out that she was abusing IV drugs.  He has made her promise that she will go get treatment and they have called various rehabs to try and get her in.  She does not seem to be as active in seeking treatment as he and his partner are and I encouraged patient that this is not safe for him or his family and he needs to set an ultimatum in which she leaves the house or goes to get treatment.  Patient was understanding of this and was set on doing this today.  He feels that medications are working appropriately and he is not having any side effects, sedation, or falls.  He denies SI/HI/AVH.    The following portions of the patient's history were reviewed and updated as appropriate: allergies, current medications, past family history, past medical history, past social history, past surgical history and problem list.      Past Medical History:  Past Medical History:   Diagnosis Date   • Anxiety        Social History:  Social History     Socioeconomic History   • Marital status:    Tobacco Use   • Smoking status: Never Smoker   • Smokeless tobacco: Current User     Types: Chew   Vaping Use   • Vaping Use: Never used   Substance and Sexual Activity   • Alcohol use: Never   • Drug use: Never   • Sexual activity: Defer       Family History:  Family History   Problem Relation Age of Onset   • Anxiety disorder Mother    • No Known Problems Father    • Anxiety disorder Maternal  Uncle        Past Surgical History:  History reviewed. No pertinent surgical history.    Problem List:  Patient Active Problem List   Diagnosis   • Type 2 diabetes mellitus, without long-term current use of insulin ([facility])       Allergy:   Allergies   Allergen Reactions   • Demerol [Meperidine] Hives   • Penicillins Hives        Current Medications:   Current Outpatient Medications   Medication Sig Dispense Refill   • albuterol sulfate  (90 Base) MCG/ACT inhaler      • albuterol sulfate  (90 Base) MCG/ACT inhaler Inhale 2 puffs Every 4 (Four) Hours As Needed for Wheezing. 18 g 0   • amLODIPine (NORVASC) 5 MG tablet TAKE 1 TABLET BY MOUTH EVERY DAY FOR BLOOD PRESSURE. DOSE INCREASE.     • BD Pen Needle Delfina U/F 32G X 4 MM misc 1 each by Other route Daily.     • buprenorphine-naloxone (SUBOXONE) 8-2 MG film film Place  under the tongue Daily.     • clonazePAM (KlonoPIN) 0.5 MG tablet Take 1 tablet by mouth 3 (Three) Times a Day As Needed for Anxiety. 90 tablet 2   • fluticasone (FLONASE) 50 MCG/ACT nasal spray 2 sprays into the nostril(s) as directed by provider Daily. Administer 2 sprays in each nostril for each dose. 18 g 0   • gabapentin (NEURONTIN) 800 MG tablet      • Lantus 100 UNIT/ML injection INJECT 10 UNITS SUBCUTANEOUSLY EVERY NIGHT AT BEDTIME     • latanoprost (XALATAN) 0.005 % ophthalmic solution Administer 1 drop to both eyes every night at bedtime.     • lisinopril (PRINIVIL,ZESTRIL) 40 MG tablet      • Narcan 4 MG/0.1ML nasal spray      • ondansetron (ZOFRAN) 4 MG tablet      • Pro Comfort Lancets 31G misc USE TO CHECK BLOOD SUGAR LEVEL THREE TIMES A DAY     • promethazine-dextromethorphan (PROMETHAZINE-DM) 6.25-15 MG/5ML syrup Take 5 mL by mouth 4 (Four) Times a Day As Needed for Cough. 118 mL 0   • QUEtiapine (SEROquel) 50 MG tablet Take 1.5 tablets by mouth Every Night. 90 tablet 0   • triamcinolone (KENALOG) 0.1 % cream      • triamcinolone (KENALOG) 0.1 % ointment APPLY SPARINGLY TO  "AFFECTED AREA(S) TWO TIMES A DAY     • Triamcinolone Acetonide 0.025 % lotion APPLY SPARINGLY TO AFFECTED AREA(S) DAILY       No current facility-administered medications for this visit.       Review of Symptoms:    Review of Systems   Constitutional: Positive for activity change (improved) and fatigue. Negative for chills, diaphoresis, fever and unexpected weight loss.   HENT: Negative for congestion, rhinorrhea, sinus pressure and sneezing.    Eyes: Negative.    Respiratory: Positive for chest tightness. Negative for cough and shortness of breath.    Cardiovascular: Positive for palpitations. Negative for chest pain and leg swelling.   Gastrointestinal: Negative.    Endocrine: Negative.    Genitourinary: Negative.    Musculoskeletal: Positive for arthralgias, back pain and myalgias.   Skin: Negative.    Allergic/Immunologic: Negative.    Neurological: Positive for tremors.   Hematological: Negative.    Psychiatric/Behavioral: Positive for stress. The patient is not nervous/anxious.          Physical Exam:   Blood pressure 122/78, pulse 79, height 182.9 cm (72.01\"), weight 103 kg (227 lb 12.8 oz).      Appearance:  male of stated age in no acute distress, NAD  Gait, Station, Strength: CM of stated age, NAD     Mental Status Exam:     Hygiene:   good  Cooperation:  Cooperative  Eye Contact:  Good  Psychomotor Behavior:  Appropriate  Affect:  Full range  Mood: anxious, panicky and stressed  Hopelessness: Optimistic  Speech:  Normal  Thought Process:  Goal directed and Linear  Thought Content:  Normal and Mood congruent  Suicidal:  None  Homicidal:  None  Hallucinations:  None  Delusion:  None  Memory:  Intact  Orientation:  Person, Place, Time and Situation  Reliability:  good  Insight:  Good  Judgement:  Good  Impulse Control:  Good  Physical/Medical Issues:  Yes chronic pain from previous work related accident       Lab Results:   No visits with results within 1 Month(s) from this visit.   Latest known " visit with results is:   Telemedicine on 12/21/2021   Component Date Value Ref Range Status   • External Amphetamine Screen Urine 12/21/2021 Negative   Final   • Amphetamine Cut-Off 12/21/2021 1000ng/ml   Final   • External Benzodiazepine Screen Uri* 12/21/2021 Negative   Final   • Benzodiazipine Cut-Off 12/21/2021 300ng/ml   Final   • External Cocaine Screen Urine 12/21/2021 Negative   Final   • Cocaine Cut-Off 12/21/2021 300ng/ml   Final   • External THC Screen Urine 12/21/2021 Negative   Final   • THC Cut-Off 12/21/2021 50ng/ml   Final   • External Methadone Screen Urine 12/21/2021 Negative   Final   • Methadone Cut-Off 12/21/2021 300ng/ml   Final   • External Methamphetamine Screen Ur* 12/21/2021 Negative   Final   • Methamphetamine Cut-Off 12/21/2021 1000ng/ml   Final   • External Oxycodone Screen Urine 12/21/2021 Negative   Final   • Oxycodone Cut-Off 12/21/2021 100ng/ml   Final   • External Buprenorphine Screen Urine 12/21/2021 Negative   Final   • Buprenorphine Cut-Off 12/21/2021 10ng/ml   Final   • External MDMA 12/21/2021 Negative   Final   • MDMA Cut-Off 12/21/2021 500ng/ml   Final   • External Opiates Screen Urine 12/21/2021 Negative   Final   • Opiates Cut-Off 12/21/2021 300ng/ml   Final       Assessment & Plan   Diagnoses and all orders for this visit:    1. JUAN LUIS (generalized anxiety disorder) (Primary)    2. Panic disorder with agoraphobia    3. Social anxiety disorder    4. Psychophysiological insomnia    5. Opioid use disorder, severe, in sustained remission, on maintenance therapy (HCC)    6. Nicotine dependence, chewing tobacco, uncomplicated    -Patient overall coping appropriately but is having some increased life stressors leading to worsening anxiety, intermittent dysphoria, and worsening panic symptoms.  It is situational in nature and at baseline his symptoms seem to be well controlled.  -Reviewed previous available documentation  -Reviewed previous medical records and scanned to  chart  -Reviewed most recent available labs  -MICHEAL reviewed and appropriate. Patient counseled on use of controlled substances.   -Continue Seroquel 50 mg p.o. nightly for insomnia.  Patient gets this from an outside provider  -Continue clonazepam 0.5 mg p.o.  3 times daily as needed for anxiety.  Patient advised of the risks of using benzodiazepines with concurrent opioid maintenance therapy.  In weighing the risks and benefits, patient is more active and functional during the day and is also taking an active role in his day-to-day life which is a functional improvement over before.  We will continue the medication for now.  The eventual goal will be to taper off of the medication completely or have patient taper off of his Suboxone, however he has been on benzodiazepines for an extended amount of time and this may prove to be difficult. Patient will be maintained on lowest dose possible due to these issues and interactions.   -Encouraged to continue maintenance therapy of Suboxone 16 mg sublingually daily for opioid use disorder.  Eventual goal is to taper off of her medication  -Encourage cessation of tobacco products  -Encouraged patient to consider therapy      Visit Diagnoses:    ICD-10-CM ICD-9-CM   1. JUAN LUIS (generalized anxiety disorder)  F41.1 300.02   2. Panic disorder with agoraphobia  F40.01 300.21   3. Social anxiety disorder  F40.10 300.23   4. Psychophysiological insomnia  F51.04 307.42   5. Opioid use disorder, severe, in sustained remission, on maintenance therapy (HCC)  F11.21 304.03   6. Nicotine dependence, chewing tobacco, uncomplicated  F17.220 305.1       TREATMENT PLAN/GOALS: Continue supportive psychotherapy efforts and medications as indicated. Treatment and medication options discussed during today's visit. Patient acknowledged and verbally consented to continue with current treatment plan and was educated on the importance of compliance with treatment and follow-up  appointments.    MEDICATION ISSUES:    Discussed medication options and treatment plan of prescribed medication as well as the risks, benefits, and side effects including potential falls, possible impaired driving and metabolic adversities among others. Patient is agreeable to call the office with any worsening of symptoms or onset of side effects. Patient is agreeable to call 911 or go to the nearest ER should he/she begin having SI/HI.     MEDS ORDERED DURING VISIT:  No orders of the defined types were placed in this encounter.      Return in about 3 months (around 9/2/2022).             This document has been electronically signed by Yanick Coon MD  June 2, 2022 13:46 EDT

## 2022-07-08 DIAGNOSIS — F51.04 PSYCHOPHYSIOLOGICAL INSOMNIA: ICD-10-CM

## 2022-07-08 DIAGNOSIS — F41.1 GAD (GENERALIZED ANXIETY DISORDER): ICD-10-CM

## 2022-07-08 RX ORDER — QUETIAPINE FUMARATE 50 MG/1
TABLET, FILM COATED ORAL
Qty: 90 TABLET | Refills: 0 | Status: SHIPPED | OUTPATIENT
Start: 2022-07-08 | End: 2022-08-22 | Stop reason: SDUPTHER

## 2022-08-22 ENCOUNTER — TELEPHONE (OUTPATIENT)
Dept: PSYCHIATRY | Facility: CLINIC | Age: 57
End: 2022-08-22

## 2022-08-22 DIAGNOSIS — F41.1 GAD (GENERALIZED ANXIETY DISORDER): ICD-10-CM

## 2022-08-22 DIAGNOSIS — F51.04 PSYCHOPHYSIOLOGICAL INSOMNIA: ICD-10-CM

## 2022-08-22 RX ORDER — QUETIAPINE FUMARATE 100 MG/1
150 TABLET, FILM COATED ORAL NIGHTLY
Qty: 45 TABLET | Refills: 2 | Status: SHIPPED | OUTPATIENT
Start: 2022-08-22 | End: 2022-09-06 | Stop reason: SDUPTHER

## 2022-08-22 RX ORDER — QUETIAPINE FUMARATE 100 MG/1
75 TABLET, FILM COATED ORAL NIGHTLY
Qty: 45 TABLET | Refills: 1 | Status: SHIPPED | OUTPATIENT
Start: 2022-08-22 | End: 2022-08-22

## 2022-08-22 NOTE — TELEPHONE ENCOUNTER
Patient called wanting to know if you could raise his seroquel says he is taken like 75 mg to sleep and needs more says he broke his arm and needs something stronger to sleep where he cant taken pain meds due to the suboxone

## 2022-09-06 ENCOUNTER — OFFICE VISIT (OUTPATIENT)
Dept: PSYCHIATRY | Facility: CLINIC | Age: 57
End: 2022-09-06

## 2022-09-06 VITALS
SYSTOLIC BLOOD PRESSURE: 128 MMHG | WEIGHT: 233 LBS | HEART RATE: 68 BPM | HEIGHT: 72 IN | BODY MASS INDEX: 31.56 KG/M2 | DIASTOLIC BLOOD PRESSURE: 82 MMHG

## 2022-09-06 DIAGNOSIS — F40.01 PANIC DISORDER WITH AGORAPHOBIA: ICD-10-CM

## 2022-09-06 DIAGNOSIS — F40.10 SOCIAL ANXIETY DISORDER: ICD-10-CM

## 2022-09-06 DIAGNOSIS — F51.04 PSYCHOPHYSIOLOGICAL INSOMNIA: ICD-10-CM

## 2022-09-06 DIAGNOSIS — F41.1 GAD (GENERALIZED ANXIETY DISORDER): Primary | ICD-10-CM

## 2022-09-06 PROCEDURE — 99214 OFFICE O/P EST MOD 30 MIN: CPT | Performed by: PSYCHIATRY & NEUROLOGY

## 2022-09-06 RX ORDER — QUETIAPINE FUMARATE 100 MG/1
150 TABLET, FILM COATED ORAL NIGHTLY
Qty: 45 TABLET | Refills: 2 | Status: SHIPPED | OUTPATIENT
Start: 2022-09-06 | End: 2023-01-23 | Stop reason: SDUPTHER

## 2022-09-06 RX ORDER — CLONAZEPAM 0.5 MG/1
0.5 TABLET ORAL 3 TIMES DAILY PRN
Qty: 90 TABLET | Refills: 2 | Status: SHIPPED | OUTPATIENT
Start: 2022-09-06 | End: 2022-11-21

## 2022-09-06 NOTE — PROGRESS NOTES
Subjective   Macario Aguilar is a 57 y.o. male who presents today for follow up    Chief Complaint: Panic disorder, social anxiety, anxiety      History of Present Illness: Patient presented today for follow-up.  Since last visit, he has been relatively stable.  He did have some life stressors which caused an increase in anxiety and panic symptoms but overall he feels his medication is working well.  He did have a fall recently as he is wearing old shoes and this all was coming off which got caught on a ramp going into an outside building which caused patient to fall on his arm and break it in 2 places.  He is currently wearing a cast on his right arm and has a follow-up on Thursday to see if he will require surgery but he does feel that it is healing as he has more function than he had before.  He is not having any medication side effects.  In between visits, he had some difficulty sleeping and we increased his medication and he feels that this has helped.  He denies SI/HI/AVH.  He did recently lose a family member to COVID.    The following portions of the patient's history were reviewed and updated as appropriate: allergies, current medications, past family history, past medical history, past social history, past surgical history and problem list.      Past Medical History:  Past Medical History:   Diagnosis Date   • Anxiety        Social History:  Social History     Socioeconomic History   • Marital status:    Tobacco Use   • Smoking status: Never Smoker   • Smokeless tobacco: Current User     Types: Chew   Vaping Use   • Vaping Use: Never used   Substance and Sexual Activity   • Alcohol use: Never   • Drug use: Never   • Sexual activity: Defer       Family History:  Family History   Problem Relation Age of Onset   • Anxiety disorder Mother    • No Known Problems Father    • Anxiety disorder Maternal Uncle        Past Surgical History:  History reviewed. No pertinent surgical history.    Problem  List:  Patient Active Problem List   Diagnosis   • Type 2 diabetes mellitus, without long-term current use of insulin ([facility])       Allergy:   Allergies   Allergen Reactions   • Demerol [Meperidine] Hives   • Penicillins Hives        Current Medications:   Current Outpatient Medications   Medication Sig Dispense Refill   • albuterol sulfate  (90 Base) MCG/ACT inhaler      • albuterol sulfate  (90 Base) MCG/ACT inhaler Inhale 2 puffs Every 4 (Four) Hours As Needed for Wheezing. 18 g 0   • amLODIPine (NORVASC) 5 MG tablet TAKE 1 TABLET BY MOUTH EVERY DAY FOR BLOOD PRESSURE. DOSE INCREASE.     • BD Pen Needle Delfina U/F 32G X 4 MM misc 1 each by Other route Daily.     • buprenorphine-naloxone (SUBOXONE) 8-2 MG film film Place  under the tongue Daily.     • clonazePAM (KlonoPIN) 0.5 MG tablet Take 1 tablet by mouth 3 (Three) Times a Day As Needed for Anxiety. 90 tablet 2   • fluticasone (FLONASE) 50 MCG/ACT nasal spray 2 sprays into the nostril(s) as directed by provider Daily. Administer 2 sprays in each nostril for each dose. 18 g 0   • gabapentin (NEURONTIN) 800 MG tablet      • Lantus 100 UNIT/ML injection INJECT 10 UNITS SUBCUTANEOUSLY EVERY NIGHT AT BEDTIME     • latanoprost (XALATAN) 0.005 % ophthalmic solution Administer 1 drop to both eyes every night at bedtime.     • lisinopril (PRINIVIL,ZESTRIL) 40 MG tablet      • Narcan 4 MG/0.1ML nasal spray      • ondansetron (ZOFRAN) 4 MG tablet      • Pro Comfort Lancets 31G misc USE TO CHECK BLOOD SUGAR LEVEL THREE TIMES A DAY     • promethazine-dextromethorphan (PROMETHAZINE-DM) 6.25-15 MG/5ML syrup Take 5 mL by mouth 4 (Four) Times a Day As Needed for Cough. 118 mL 0   • QUEtiapine (SEROquel) 100 MG tablet Take 1.5 tablets by mouth Every Night. 45 tablet 2   • triamcinolone (KENALOG) 0.1 % cream      • triamcinolone (KENALOG) 0.1 % ointment APPLY SPARINGLY TO AFFECTED AREA(S) TWO TIMES A DAY     • Triamcinolone Acetonide 0.025 % lotion APPLY SPARINGLY TO  "AFFECTED AREA(S) DAILY       No current facility-administered medications for this visit.       Review of Symptoms:    Review of Systems   Constitutional: Positive for activity change (improved) and fatigue. Negative for chills, diaphoresis, fever and unexpected weight loss.   HENT: Negative for congestion, rhinorrhea, sinus pressure and sneezing.    Eyes: Negative.    Respiratory: Positive for chest tightness. Negative for cough and shortness of breath.    Cardiovascular: Positive for palpitations. Negative for chest pain and leg swelling.   Gastrointestinal: Negative.    Endocrine: Negative.    Genitourinary: Negative.    Musculoskeletal: Positive for arthralgias, back pain and myalgias.   Skin: Negative.    Allergic/Immunologic: Negative.    Neurological: Positive for tremors.   Hematological: Negative.    Psychiatric/Behavioral: Positive for dysphoric mood and stress. The patient is not nervous/anxious.          Physical Exam:   Blood pressure 128/82, pulse 68, height 182.9 cm (72.01\"), weight 106 kg (233 lb).      Appearance:  male of stated age in no acute distress, NAD  Gait, Station, Strength: CM of stated age, NAD     Mental Status Exam:     Hygiene:   good  Cooperation:  Cooperative  Eye Contact:  Good  Psychomotor Behavior:  Appropriate  Affect:  Full range  Mood: normal, situational stressors   Hopelessness: Optimistic  Speech:  Normal  Thought Process:  Goal directed and Linear  Thought Content:  Normal and Mood congruent  Suicidal:  None  Homicidal:  None  Hallucinations:  None  Delusion:  None  Memory:  Intact  Orientation:  Person, Place, Time and Situation  Reliability:  good  Insight:  Good  Judgement:  Good  Impulse Control:  Good  Physical/Medical Issues:  Yes chronic pain from previous work related accident       Lab Results:   No visits with results within 1 Month(s) from this visit.   Latest known visit with results is:   Telemedicine on 12/21/2021   Component Date Value Ref Range " Status   • External Amphetamine Screen Urine 12/21/2021 Negative   Final   • Amphetamine Cut-Off 12/21/2021 1000ng/ml   Final   • External Benzodiazepine Screen Uri* 12/21/2021 Negative   Final   • Benzodiazipine Cut-Off 12/21/2021 300ng/ml   Final   • External Cocaine Screen Urine 12/21/2021 Negative   Final   • Cocaine Cut-Off 12/21/2021 300ng/ml   Final   • External THC Screen Urine 12/21/2021 Negative   Final   • THC Cut-Off 12/21/2021 50ng/ml   Final   • External Methadone Screen Urine 12/21/2021 Negative   Final   • Methadone Cut-Off 12/21/2021 300ng/ml   Final   • External Methamphetamine Screen Ur* 12/21/2021 Negative   Final   • Methamphetamine Cut-Off 12/21/2021 1000ng/ml   Final   • External Oxycodone Screen Urine 12/21/2021 Negative   Final   • Oxycodone Cut-Off 12/21/2021 100ng/ml   Final   • External Buprenorphine Screen Urine 12/21/2021 Negative   Final   • Buprenorphine Cut-Off 12/21/2021 10ng/ml   Final   • External MDMA 12/21/2021 Negative   Final   • MDMA Cut-Off 12/21/2021 500ng/ml   Final   • External Opiates Screen Urine 12/21/2021 Negative   Final   • Opiates Cut-Off 12/21/2021 300ng/ml   Final       Assessment & Plan   Diagnoses and all orders for this visit:    1. JUAN LUIS (generalized anxiety disorder) (Primary)  -     clonazePAM (KlonoPIN) 0.5 MG tablet; Take 1 tablet by mouth 3 (Three) Times a Day As Needed for Anxiety.  Dispense: 90 tablet; Refill: 2    2. Panic disorder with agoraphobia  -     clonazePAM (KlonoPIN) 0.5 MG tablet; Take 1 tablet by mouth 3 (Three) Times a Day As Needed for Anxiety.  Dispense: 90 tablet; Refill: 2    3. Social anxiety disorder  -     clonazePAM (KlonoPIN) 0.5 MG tablet; Take 1 tablet by mouth 3 (Three) Times a Day As Needed for Anxiety.  Dispense: 90 tablet; Refill: 2    4. Psychophysiological insomnia  -     clonazePAM (KlonoPIN) 0.5 MG tablet; Take 1 tablet by mouth 3 (Three) Times a Day As Needed for Anxiety.  Dispense: 90 tablet; Refill: 2  -     QUEtiapine  (SEROquel) 100 MG tablet; Take 1.5 tablets by mouth Every Night.  Dispense: 45 tablet; Refill: 2    -Patient doing relatively well. He is having situational stressors and had some difficulty with sleep as a result but with the increase in between visits, he has improved though he is having difficulty with sleep due to pain.   -Reviewed previous available documentation  -Reviewed previous medical records and scanned to chart  -Reviewed most recent available labs  -MICHEAL reviewed and appropriate. Patient counseled on use of controlled substances.   -Increased Seroquel to 150 mg to 225 mg p.o. nightly as needed for insomnia.  Patient gets this from an outside provider  -Continue clonazepam 0.5 mg p.o.  3 times daily as needed for anxiety.  Patient advised of the risks of using benzodiazepines with concurrent opioid maintenance therapy.  In weighing the risks and benefits, patient is more active and functional during the day and is also taking an active role in his day-to-day life which is a functional improvement over before.  We will continue the medication for now.  The eventual goal will be to taper off of the medication completely or have patient taper off of his Suboxone, however he has been on benzodiazepines for an extended amount of time and this may prove to be difficult. Patient will be maintained on lowest dose possible due to these issues and interactions.   -Encouraged to continue maintenance therapy of Suboxone 8 mg sublingually daily for opioid use disorder.  Patient has successfully tapered to 8 mg a day from 60 mg daily  -Encourage cessation of tobacco products  -Encouraged patient to consider therapy      Visit Diagnoses:    ICD-10-CM ICD-9-CM   1. JUAN LUIS (generalized anxiety disorder)  F41.1 300.02   2. Panic disorder with agoraphobia  F40.01 300.21   3. Social anxiety disorder  F40.10 300.23   4. Psychophysiological insomnia  F51.04 307.42       TREATMENT PLAN/GOALS: Continue supportive psychotherapy  efforts and medications as indicated. Treatment and medication options discussed during today's visit. Patient acknowledged and verbally consented to continue with current treatment plan and was educated on the importance of compliance with treatment and follow-up appointments.    MEDICATION ISSUES:    Discussed medication options and treatment plan of prescribed medication as well as the risks, benefits, and side effects including potential falls, possible impaired driving and metabolic adversities among others. Patient is agreeable to call the office with any worsening of symptoms or onset of side effects. Patient is agreeable to call 911 or go to the nearest ER should he/she begin having SI/HI.     MEDS ORDERED DURING VISIT:  New Medications Ordered This Visit   Medications   • clonazePAM (KlonoPIN) 0.5 MG tablet     Sig: Take 1 tablet by mouth 3 (Three) Times a Day As Needed for Anxiety.     Dispense:  90 tablet     Refill:  2   • QUEtiapine (SEROquel) 100 MG tablet     Sig: Take 1.5 tablets by mouth Every Night.     Dispense:  45 tablet     Refill:  2     Please disregard prescription sent today for a lower dosing, electronic script error.       Return in about 3 months (around 12/6/2022).             This document has been electronically signed by Yanick Coon MD  September 6, 2022 13:53 EDT

## 2022-11-21 DIAGNOSIS — F41.1 GAD (GENERALIZED ANXIETY DISORDER): ICD-10-CM

## 2022-11-21 DIAGNOSIS — F40.10 SOCIAL ANXIETY DISORDER: ICD-10-CM

## 2022-11-21 DIAGNOSIS — F51.04 PSYCHOPHYSIOLOGICAL INSOMNIA: ICD-10-CM

## 2022-11-21 DIAGNOSIS — F40.01 PANIC DISORDER WITH AGORAPHOBIA: ICD-10-CM

## 2022-11-21 RX ORDER — CLONAZEPAM 0.5 MG/1
TABLET ORAL
Qty: 90 TABLET | Refills: 0 | Status: SHIPPED | OUTPATIENT
Start: 2022-11-21 | End: 2022-12-27 | Stop reason: SDUPTHER

## 2022-11-22 RX ORDER — CLONAZEPAM 0.5 MG/1
TABLET ORAL
Qty: 90 TABLET | OUTPATIENT
Start: 2022-11-22

## 2022-12-27 DIAGNOSIS — F40.01 PANIC DISORDER WITH AGORAPHOBIA: ICD-10-CM

## 2022-12-27 DIAGNOSIS — F41.1 GAD (GENERALIZED ANXIETY DISORDER): ICD-10-CM

## 2022-12-27 DIAGNOSIS — F51.04 PSYCHOPHYSIOLOGICAL INSOMNIA: ICD-10-CM

## 2022-12-27 DIAGNOSIS — F40.10 SOCIAL ANXIETY DISORDER: ICD-10-CM

## 2022-12-27 RX ORDER — CLONAZEPAM 0.5 MG/1
0.5 TABLET ORAL 3 TIMES DAILY PRN
Qty: 90 TABLET | Refills: 0 | Status: SHIPPED | OUTPATIENT
Start: 2022-12-27 | End: 2023-01-23 | Stop reason: SDUPTHER

## 2023-01-23 ENCOUNTER — OFFICE VISIT (OUTPATIENT)
Dept: PSYCHIATRY | Facility: CLINIC | Age: 58
End: 2023-01-23
Payer: MEDICARE

## 2023-01-23 VITALS
DIASTOLIC BLOOD PRESSURE: 84 MMHG | HEIGHT: 72 IN | SYSTOLIC BLOOD PRESSURE: 129 MMHG | WEIGHT: 246 LBS | BODY MASS INDEX: 33.32 KG/M2 | HEART RATE: 90 BPM

## 2023-01-23 DIAGNOSIS — F40.01 PANIC DISORDER WITH AGORAPHOBIA: ICD-10-CM

## 2023-01-23 DIAGNOSIS — F51.04 PSYCHOPHYSIOLOGICAL INSOMNIA: ICD-10-CM

## 2023-01-23 DIAGNOSIS — F40.10 SOCIAL ANXIETY DISORDER: ICD-10-CM

## 2023-01-23 DIAGNOSIS — Z79.899 MEDICATION MANAGEMENT: ICD-10-CM

## 2023-01-23 DIAGNOSIS — F41.1 GAD (GENERALIZED ANXIETY DISORDER): Primary | ICD-10-CM

## 2023-01-23 LAB
EXTERNAL AMPHETAMINE SCREEN URINE: NEGATIVE
EXTERNAL BENZODIAZEPINE SCREEN URINE: NEGATIVE
EXTERNAL BUPRENORPHINE SCREEN URINE: POSITIVE
EXTERNAL COCAINE SCREEN URINE: NEGATIVE
EXTERNAL MDMA: NEGATIVE
EXTERNAL METHADONE SCREEN URINE: NEGATIVE
EXTERNAL METHAMPHETAMINE SCREEN URINE: NEGATIVE
EXTERNAL OPIATES SCREEN URINE: NEGATIVE
EXTERNAL OXYCODONE SCREEN URINE: NEGATIVE
EXTERNAL THC SCREEN URINE: NEGATIVE

## 2023-01-23 PROCEDURE — 99214 OFFICE O/P EST MOD 30 MIN: CPT | Performed by: PSYCHIATRY & NEUROLOGY

## 2023-01-23 RX ORDER — CLONAZEPAM 0.5 MG/1
0.5 TABLET ORAL 3 TIMES DAILY PRN
Qty: 90 TABLET | Refills: 2 | Status: SHIPPED | OUTPATIENT
Start: 2023-01-23

## 2023-01-23 RX ORDER — QUETIAPINE FUMARATE 100 MG/1
150 TABLET, FILM COATED ORAL NIGHTLY
Qty: 45 TABLET | Refills: 2 | Status: SHIPPED | OUTPATIENT
Start: 2023-01-23

## 2023-01-23 NOTE — PROGRESS NOTES
Subjective   Macario Aguilar is a 57 y.o. male who presents today for follow up    Chief Complaint: Panic disorder, social anxiety, anxiety      History of Present Illness: Patient presented today for follow-up.  Since last visit, patient has been relatively stable.  He is not having any major panic or anxiety symptoms and feels that medication is working well.  He does endorse that his sleeping medication, Seroquel, causes him to have very vivid dreams and he will often talk in his sleep or wake up from vivid dreams but he is not overly bothered and reports that he is not having nightmares and feels that his overall sleep quality is stable and appropriate.  He denies SI/HI/AVH.  He reports no major changes to life or social situation.    The following portions of the patient's history were reviewed and updated as appropriate: allergies, current medications, past family history, past medical history, past social history, past surgical history and problem list.      Past Medical History:  Past Medical History:   Diagnosis Date   • Anxiety        Social History:  Social History     Socioeconomic History   • Marital status:    Tobacco Use   • Smoking status: Never   • Smokeless tobacco: Current     Types: Chew   Vaping Use   • Vaping Use: Never used   Substance and Sexual Activity   • Alcohol use: Never   • Drug use: Never   • Sexual activity: Defer       Family History:  Family History   Problem Relation Age of Onset   • Anxiety disorder Mother    • No Known Problems Father    • Anxiety disorder Maternal Uncle        Past Surgical History:  No past surgical history on file.    Problem List:  Patient Active Problem List   Diagnosis   • Type 2 diabetes mellitus, without long-term current use of insulin ([facility])       Allergy:   Allergies   Allergen Reactions   • Demerol [Meperidine] Hives   • Penicillins Hives        Current Medications:   Current Outpatient Medications   Medication Sig Dispense Refill   • albuterol  sulfate  (90 Base) MCG/ACT inhaler Inhale 2 puffs Every 4 (Four) Hours As Needed for Wheezing. 18 g 0   • amLODIPine (NORVASC) 5 MG tablet TAKE 1 TABLET BY MOUTH EVERY DAY FOR BLOOD PRESSURE. DOSE INCREASE.     • BD Pen Needle Delfina U/F 32G X 4 MM misc 1 each by Other route Daily.     • buprenorphine-naloxone (SUBOXONE) 8-2 MG film film Place  under the tongue Daily.     • clonazePAM (KlonoPIN) 0.5 MG tablet Take 1 tablet by mouth 3 (Three) Times a Day As Needed for Anxiety. for anxiety 90 tablet 0   • gabapentin (NEURONTIN) 800 MG tablet      • Lantus 100 UNIT/ML injection INJECT 10 UNITS SUBCUTANEOUSLY EVERY NIGHT AT BEDTIME     • lisinopril (PRINIVIL,ZESTRIL) 40 MG tablet      • Narcan 4 MG/0.1ML nasal spray      • ondansetron (ZOFRAN) 4 MG tablet      • Pro Comfort Lancets 31G misc USE TO CHECK BLOOD SUGAR LEVEL THREE TIMES A DAY     • QUEtiapine (SEROquel) 100 MG tablet Take 1.5 tablets by mouth Every Night. 45 tablet 2   • triamcinolone (KENALOG) 0.1 % cream      • albuterol sulfate  (90 Base) MCG/ACT inhaler      • fluticasone (FLONASE) 50 MCG/ACT nasal spray 2 sprays into the nostril(s) as directed by provider Daily. Administer 2 sprays in each nostril for each dose. 18 g 0   • latanoprost (XALATAN) 0.005 % ophthalmic solution Administer 1 drop to both eyes every night at bedtime.     • promethazine-dextromethorphan (PROMETHAZINE-DM) 6.25-15 MG/5ML syrup Take 5 mL by mouth 4 (Four) Times a Day As Needed for Cough. 118 mL 0   • triamcinolone (KENALOG) 0.1 % ointment APPLY SPARINGLY TO AFFECTED AREA(S) TWO TIMES A DAY     • Triamcinolone Acetonide 0.025 % lotion APPLY SPARINGLY TO AFFECTED AREA(S) DAILY       No current facility-administered medications for this visit.       Review of Symptoms:    Review of Systems   Constitutional: Negative for activity change (improved), chills, diaphoresis, fatigue, fever and unexpected weight loss.   HENT: Negative for congestion, rhinorrhea, sinus pressure  "and sneezing.    Eyes: Negative.    Respiratory: Positive for chest tightness. Negative for cough and shortness of breath.    Cardiovascular: Positive for palpitations. Negative for chest pain and leg swelling.   Gastrointestinal: Negative.    Endocrine: Negative.    Genitourinary: Negative.    Musculoskeletal: Positive for arthralgias, back pain and myalgias.   Skin: Negative.    Allergic/Immunologic: Negative.    Neurological: Positive for tremors.   Hematological: Negative.    Psychiatric/Behavioral: Negative for dysphoric mood and stress. The patient is not nervous/anxious.          Physical Exam:   Blood pressure 129/84, pulse 90, height 182.9 cm (72.01\"), weight 112 kg (246 lb).      Appearance:  male of stated age in no acute distress, NAD  Gait, Station, Strength: CM of stated age, NAD     Mental Status Exam:     Mental Status exam performed 01/23/2023  and patient shows no significant changes from previous exam.     Hygiene:   good  Cooperation:  Cooperative  Eye Contact:  Good  Psychomotor Behavior:  Appropriate  Affect:  Full range  Mood: normal, stable   Hopelessness: Optimistic  Speech:  Normal  Thought Process:  Goal directed and Linear  Thought Content:  Normal and Mood congruent  Suicidal:  None  Homicidal:  None  Hallucinations:  None  Delusion:  None  Memory:  Intact  Orientation:  Person, Place, Time and Situation  Reliability:  good  Insight:  Good  Judgement:  Good  Impulse Control:  Good  Physical/Medical Issues:  Yes chronic pain from previous work related accident       Lab Results:   Office Visit on 01/23/2023   Component Date Value Ref Range Status   • External Amphetamine Screen Urine 01/23/2023 Negative   Final   • External Benzodiazepine Screen Uri* 01/23/2023 Negative   Final   • External Cocaine Screen Urine 01/23/2023 Negative   Final   • External THC Screen Urine 01/23/2023 Negative   Final   • External Methadone Screen Urine 01/23/2023 Negative   Final   • External " Methamphetamine Screen Ur* 01/23/2023 Negative   Final   • External Oxycodone Screen Urine 01/23/2023 Negative   Final   • External Buprenorphine Screen Urine 01/23/2023 Positive (A)   Final   • External MDMA 01/23/2023 Negative   Final   • External Opiates Screen Urine 01/23/2023 Negative   Final       Assessment & Plan   Diagnoses and all orders for this visit:    1. JUAN LUIS (generalized anxiety disorder) (Primary)  -     clonazePAM (KlonoPIN) 0.5 MG tablet; Take 1 tablet by mouth 3 (Three) Times a Day As Needed for Anxiety. for anxiety  Dispense: 90 tablet; Refill: 2    2. Medication management  -     [facility] Screen    3. Panic disorder with agoraphobia  -     clonazePAM (KlonoPIN) 0.5 MG tablet; Take 1 tablet by mouth 3 (Three) Times a Day As Needed for Anxiety. for anxiety  Dispense: 90 tablet; Refill: 2    4. Social anxiety disorder  -     clonazePAM (KlonoPIN) 0.5 MG tablet; Take 1 tablet by mouth 3 (Three) Times a Day As Needed for Anxiety. for anxiety  Dispense: 90 tablet; Refill: 2    5. Psychophysiological insomnia  -     clonazePAM (KlonoPIN) 0.5 MG tablet; Take 1 tablet by mouth 3 (Three) Times a Day As Needed for Anxiety. for anxiety  Dispense: 90 tablet; Refill: 2  -     QUEtiapine (SEROquel) 100 MG tablet; Take 1.5 tablets by mouth Every Night.  Dispense: 45 tablet; Refill: 2    -Patient doing relatively well.  He is not having any major mood or panic symptoms.  He is having some vivid dreams from the Seroquel but feels that as long as he is not having nightmares, he is doing well with the medication.  We discussed possibly changing it if needed and trazodone was ineffective in the past though Elavil was helpful.  We could restart Elavil if needed or try doxepin.  -Reviewed previous available documentation  -Reviewed previous medical records and scanned to chart  -Reviewed most recent available labs  -MICHEAL reviewed and appropriate. Patient counseled on use of controlled substances.   -Continue  Seroquel 225 mg p.o. nightly as needed for insomnia.    -Continue clonazepam 0.5 mg p.o.  3 times daily as needed for anxiety.  Patient advised of the risks of using benzodiazepines with concurrent opioid maintenance therapy.  In weighing the risks and benefits, patient is more active and functional during the day and is also taking an active role in his day-to-day life which is a functional improvement over before.  We will continue the medication for now.  The eventual goal will be to taper off of the medication completely or have patient taper off of his Suboxone, however he has been on benzodiazepines for an extended amount of time and this may prove to be difficult. Patient will be maintained on lowest dose possible due to these issues and interactions.   -Encouraged to continue maintenance therapy of Suboxone   -Encourage cessation of tobacco products  -Encouraged patient to consider therapy      Visit Diagnoses:    ICD-10-CM ICD-9-CM   1. JUAN LUIS (generalized anxiety disorder)  F41.1 300.02   2. Medication management  Z79.899 V58.69   3. Panic disorder with agoraphobia  F40.01 300.21   4. Social anxiety disorder  F40.10 300.23   5. Psychophysiological insomnia  F51.04 307.42       TREATMENT PLAN/GOALS: Continue supportive psychotherapy efforts and medications as indicated. Treatment and medication options discussed during today's visit. Patient acknowledged and verbally consented to continue with current treatment plan and was educated on the importance of compliance with treatment and follow-up appointments.    MEDICATION ISSUES:    Discussed medication options and treatment plan of prescribed medication as well as the risks, benefits, and side effects including potential falls, possible impaired driving and metabolic adversities among others. Patient is agreeable to call the office with any worsening of symptoms or onset of side effects. Patient is agreeable to call 911 or go to the nearest ER should he/she  begin having SI/HI.     MEDS ORDERED DURING VISIT:  New Medications Ordered This Visit   Medications   • clonazePAM (KlonoPIN) 0.5 MG tablet     Sig: Take 1 tablet by mouth 3 (Three) Times a Day As Needed for Anxiety. for anxiety     Dispense:  90 tablet     Refill:  2   • QUEtiapine (SEROquel) 100 MG tablet     Sig: Take 1.5 tablets by mouth Every Night.     Dispense:  45 tablet     Refill:  2     Please disregard prescription sent today for a lower dosing, electronic script error.       Return in about 3 months (around 4/23/2023).             This document has been electronically signed by Yanick Coon MD  January 23, 2023 09:26 EST

## 2023-02-02 ENCOUNTER — TELEPHONE (OUTPATIENT)
Dept: PSYCHIATRY | Facility: CLINIC | Age: 58
End: 2023-02-02
Payer: MEDICARE

## 2023-02-02 RX ORDER — AMITRIPTYLINE HYDROCHLORIDE 50 MG/1
50 TABLET, FILM COATED ORAL NIGHTLY PRN
Qty: 30 TABLET | Refills: 2 | Status: SHIPPED | OUTPATIENT
Start: 2023-02-02

## 2023-02-02 NOTE — TELEPHONE ENCOUNTER
Patient came into office and said that he is willing to try the other sleeping pill that u and him had talked about

## 2023-04-24 ENCOUNTER — OFFICE VISIT (OUTPATIENT)
Dept: PSYCHIATRY | Facility: CLINIC | Age: 58
End: 2023-04-24
Payer: MEDICARE

## 2023-04-24 VITALS
BODY MASS INDEX: 34.1 KG/M2 | SYSTOLIC BLOOD PRESSURE: 130 MMHG | HEART RATE: 106 BPM | DIASTOLIC BLOOD PRESSURE: 80 MMHG | WEIGHT: 251.8 LBS | HEIGHT: 72 IN

## 2023-04-24 DIAGNOSIS — F40.01 PANIC DISORDER WITH AGORAPHOBIA: ICD-10-CM

## 2023-04-24 DIAGNOSIS — Z79.899 MEDICATION MANAGEMENT: ICD-10-CM

## 2023-04-24 DIAGNOSIS — F51.04 PSYCHOPHYSIOLOGICAL INSOMNIA: ICD-10-CM

## 2023-04-24 DIAGNOSIS — F40.10 SOCIAL ANXIETY DISORDER: ICD-10-CM

## 2023-04-24 DIAGNOSIS — F41.1 GAD (GENERALIZED ANXIETY DISORDER): Primary | ICD-10-CM

## 2023-04-24 RX ORDER — AMITRIPTYLINE HYDROCHLORIDE 50 MG/1
50 TABLET, FILM COATED ORAL NIGHTLY PRN
Qty: 30 TABLET | Refills: 2 | Status: SHIPPED | OUTPATIENT
Start: 2023-04-24

## 2023-04-24 RX ORDER — CLONAZEPAM 0.5 MG/1
0.5 TABLET ORAL 3 TIMES DAILY PRN
Qty: 90 TABLET | Refills: 2 | Status: SHIPPED | OUTPATIENT
Start: 2023-04-24

## 2023-04-24 NOTE — PROGRESS NOTES
"Subjective   Macario Aguilar is a 57 y.o. male who presents today for follow up    Chief Complaint: Panic disorder, social anxiety, anxiety      History of Present Illness: Patient presented today for follow-up.  Since last visit, patient has been doing, \"great.\"  He reports that mood and anxiety symptoms are stable and medications are working well.  He denies any major complaints.  He reports no medication side effects.  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  He denies SI/HI/AVH.      The following portions of the patient's history were reviewed and updated as appropriate: allergies, current medications, past family history, past medical history, past social history, past surgical history and problem list.      Past Medical History:  Past Medical History:   Diagnosis Date   • Anxiety        Social History:  Social History     Socioeconomic History   • Marital status:    Tobacco Use   • Smoking status: Never   • Smokeless tobacco: Current     Types: Chew   Vaping Use   • Vaping Use: Never used   Substance and Sexual Activity   • Alcohol use: Never   • Drug use: Never   • Sexual activity: Defer       Family History:  Family History   Problem Relation Age of Onset   • Anxiety disorder Mother    • No Known Problems Father    • Anxiety disorder Maternal Uncle        Past Surgical History:  No past surgical history on file.    Problem List:  Patient Active Problem List   Diagnosis   • Type 2 diabetes mellitus, without long-term current use of insulin       Allergy:   Allergies   Allergen Reactions   • Demerol [Meperidine] Hives   • Penicillins Hives        Current Medications:   Current Outpatient Medications   Medication Sig Dispense Refill   • albuterol sulfate  (90 Base) MCG/ACT inhaler Inhale 2 puffs Every 4 (Four) Hours As Needed for Wheezing. 18 g 0   • amitriptyline (ELAVIL) 50 MG tablet Take 1 tablet by mouth At Night As Needed for Sleep. 30 tablet 2   • " "amLODIPine (NORVASC) 5 MG tablet TAKE 1 TABLET BY MOUTH EVERY DAY FOR BLOOD PRESSURE. DOSE INCREASE.     • BD Pen Needle Delfina U/F 32G X 4 MM misc 1 each by Other route Daily.     • buprenorphine-naloxone (SUBOXONE) 8-2 MG film film Place  under the tongue Daily.     • clonazePAM (KlonoPIN) 0.5 MG tablet Take 1 tablet by mouth 3 (Three) Times a Day As Needed for Anxiety. for anxiety 90 tablet 2   • gabapentin (NEURONTIN) 800 MG tablet      • Lantus 100 UNIT/ML injection INJECT 10 UNITS SUBCUTANEOUSLY EVERY NIGHT AT BEDTIME     • lisinopril (PRINIVIL,ZESTRIL) 40 MG tablet      • Narcan 4 MG/0.1ML nasal spray      • Pro Comfort Lancets 31G misc USE TO CHECK BLOOD SUGAR LEVEL THREE TIMES A DAY     • triamcinolone (KENALOG) 0.1 % cream        No current facility-administered medications for this visit.       Review of Symptoms:    Review of Systems   Constitutional: Negative for activity change (improved), chills, diaphoresis, fatigue, fever and unexpected weight loss.   HENT: Negative for congestion, rhinorrhea, sinus pressure and sneezing.    Eyes: Negative.    Respiratory: Positive for chest tightness. Negative for cough and shortness of breath.    Cardiovascular: Positive for palpitations. Negative for chest pain and leg swelling.   Gastrointestinal: Negative.    Endocrine: Negative.    Genitourinary: Negative.    Musculoskeletal: Positive for arthralgias, back pain and myalgias.   Skin: Negative.    Allergic/Immunologic: Negative.    Neurological: Positive for tremors.   Hematological: Negative.    Psychiatric/Behavioral: Negative for dysphoric mood and stress. The patient is not nervous/anxious.          Physical Exam:   Blood pressure 130/80, pulse 106, height 182.9 cm (72.01\"), weight 114 kg (251 lb 12.8 oz).      Appearance:  male of stated age in no acute distress, NAD  Gait, Station, Strength: CM of stated age, NAD     Mental Status Exam:     Mental Status exam performed 04/24/2023  and patient shows " no significant changes from previous exam.     Hygiene:   good  Cooperation:  Cooperative  Eye Contact:  Good  Psychomotor Behavior:  Appropriate  Affect:  Full range  Mood: normal, stable   Hopelessness: Optimistic  Speech:  Normal  Thought Process:  Goal directed and Linear  Thought Content:  Normal and Mood congruent  Suicidal:  None  Homicidal:  None  Hallucinations:  None  Delusion:  None  Memory:  Intact  Orientation:  Person, Place, Time and Situation  Reliability:  good  Insight:  Good  Judgement:  Good  Impulse Control:  Good  Physical/Medical Issues:  Yes chronic pain from previous work related accident       Lab Results:   No visits with results within 1 Month(s) from this visit.   Latest known visit with results is:   Office Visit on 01/23/2023   Component Date Value Ref Range Status   • External Amphetamine Screen Urine 01/23/2023 Negative   Final   • External Benzodiazepine Screen Uri* 01/23/2023 Negative   Final   • External Cocaine Screen Urine 01/23/2023 Negative   Final   • External THC Screen Urine 01/23/2023 Negative   Final   • External Methadone Screen Urine 01/23/2023 Negative   Final   • External Methamphetamine Screen Ur* 01/23/2023 Negative   Final   • External Oxycodone Screen Urine 01/23/2023 Negative   Final   • External Buprenorphine Screen Urine 01/23/2023 Positive (A)   Final   • External MDMA 01/23/2023 Negative   Final   • External Opiates Screen Urine 01/23/2023 Negative   Final       Assessment & Plan   Diagnoses and all orders for this visit:    1. JUAN LUIS (generalized anxiety disorder) (Primary)  -     clonazePAM (KlonoPIN) 0.5 MG tablet; Take 1 tablet by mouth 3 (Three) Times a Day As Needed for Anxiety. for anxiety  Dispense: 90 tablet; Refill: 2    2. Medication management  -     [facility] Screen    3. Panic disorder with agoraphobia  -     clonazePAM (KlonoPIN) 0.5 MG tablet; Take 1 tablet by mouth 3 (Three) Times a Day As Needed for Anxiety. for anxiety  Dispense: 90 tablet;  Refill: 2    4. Social anxiety disorder  -     clonazePAM (KlonoPIN) 0.5 MG tablet; Take 1 tablet by mouth 3 (Three) Times a Day As Needed for Anxiety. for anxiety  Dispense: 90 tablet; Refill: 2    5. Psychophysiological insomnia  -     clonazePAM (KlonoPIN) 0.5 MG tablet; Take 1 tablet by mouth 3 (Three) Times a Day As Needed for Anxiety. for anxiety  Dispense: 90 tablet; Refill: 2    Other orders  -     SCANNED - LABS  -     amitriptyline (ELAVIL) 50 MG tablet; Take 1 tablet by mouth At Night As Needed for Sleep.  Dispense: 30 tablet; Refill: 2    -Patient currently doing well without any major complaints.  Medications are adequately controlling symptoms.  -Reviewed previous available documentation  -Reviewed previous medical records and scanned to chart  -Reviewed most recent available labs  -MICHEAL reviewed and appropriate. Patient counseled on use of controlled substances.   -Seroquel has been discontinued  -Continue clonazepam 0.5 mg p.o.  3 times daily as needed for anxiety.  Patient advised of the risks of using benzodiazepines with concurrent opioid maintenance therapy.  In weighing the risks and benefits, patient is more active and functional during the day and is also taking an active role in his day-to-day life which is a functional improvement over before.  We will continue the medication for now.  The eventual goal will be to taper off of the medication completely or have patient taper off of his Suboxone, however he has been on benzodiazepines for an extended amount of time and this may prove to be difficult. Patient will be maintained on lowest dose possible due to these issues and interactions.   -Encouraged to continue maintenance therapy of Suboxone   -Encourage cessation of tobacco products  -Encouraged patient to consider therapy      Visit Diagnoses:    ICD-10-CM ICD-9-CM   1. JUAN LUIS (generalized anxiety disorder)  F41.1 300.02   2. Medication management  Z79.899 V58.69   3. Panic disorder with  agoraphobia  F40.01 300.21   4. Social anxiety disorder  F40.10 300.23   5. Psychophysiological insomnia  F51.04 307.42       TREATMENT PLAN/GOALS: Continue supportive psychotherapy efforts and medications as indicated. Treatment and medication options discussed during today's visit. Patient acknowledged and verbally consented to continue with current treatment plan and was educated on the importance of compliance with treatment and follow-up appointments.    MEDICATION ISSUES:    Discussed medication options and treatment plan of prescribed medication as well as the risks, benefits, and side effects including potential falls, possible impaired driving and metabolic adversities among others. Patient is agreeable to call the office with any worsening of symptoms or onset of side effects. Patient is agreeable to call 911 or go to the nearest ER should he/she begin having SI/HI.     MEDS ORDERED DURING VISIT:  New Medications Ordered This Visit   Medications   • clonazePAM (KlonoPIN) 0.5 MG tablet     Sig: Take 1 tablet by mouth 3 (Three) Times a Day As Needed for Anxiety. for anxiety     Dispense:  90 tablet     Refill:  2   • amitriptyline (ELAVIL) 50 MG tablet     Sig: Take 1 tablet by mouth At Night As Needed for Sleep.     Dispense:  30 tablet     Refill:  2       Return in about 3 months (around 7/24/2023).             This document has been electronically signed by Yanick Coon MD  April 24, 2023 10:12 EDT

## 2023-05-04 ENCOUNTER — TELEPHONE (OUTPATIENT)
Dept: PSYCHIATRY | Facility: CLINIC | Age: 58
End: 2023-05-04
Payer: MEDICARE

## 2023-05-04 RX ORDER — AMITRIPTYLINE HYDROCHLORIDE 100 MG/1
100 TABLET, FILM COATED ORAL NIGHTLY PRN
Qty: 30 TABLET | Refills: 2 | Status: SHIPPED | OUTPATIENT
Start: 2023-05-04

## 2023-07-26 ENCOUNTER — OFFICE VISIT (OUTPATIENT)
Dept: PSYCHIATRY | Facility: CLINIC | Age: 58
End: 2023-07-26
Payer: MEDICARE

## 2023-07-26 VITALS
BODY MASS INDEX: 33.56 KG/M2 | HEIGHT: 72 IN | SYSTOLIC BLOOD PRESSURE: 124 MMHG | HEART RATE: 101 BPM | WEIGHT: 247.8 LBS | DIASTOLIC BLOOD PRESSURE: 80 MMHG

## 2023-07-26 DIAGNOSIS — F40.10 SOCIAL ANXIETY DISORDER: ICD-10-CM

## 2023-07-26 DIAGNOSIS — F41.1 GAD (GENERALIZED ANXIETY DISORDER): Primary | ICD-10-CM

## 2023-07-26 DIAGNOSIS — F40.01 PANIC DISORDER WITH AGORAPHOBIA: ICD-10-CM

## 2023-07-26 DIAGNOSIS — F51.04 PSYCHOPHYSIOLOGICAL INSOMNIA: ICD-10-CM

## 2023-07-26 RX ORDER — CETIRIZINE HYDROCHLORIDE 10 MG/1
10 TABLET ORAL DAILY
Qty: 30 TABLET | Refills: 11 | COMMUNITY
Start: 2023-07-12 | End: 2024-07-11

## 2023-07-26 RX ORDER — CLONAZEPAM 0.5 MG/1
0.5 TABLET ORAL 3 TIMES DAILY PRN
Qty: 90 TABLET | Refills: 2 | Status: SHIPPED | OUTPATIENT
Start: 2023-07-26

## 2023-07-26 RX ORDER — QUETIAPINE FUMARATE 100 MG/1
1.5 TABLET, FILM COATED ORAL NIGHTLY
COMMUNITY
Start: 2023-07-12 | End: 2023-07-26 | Stop reason: SDUPTHER

## 2023-07-26 RX ORDER — QUETIAPINE FUMARATE 100 MG/1
150 TABLET, FILM COATED ORAL NIGHTLY
Qty: 45 TABLET | Refills: 2 | Status: SHIPPED | OUTPATIENT
Start: 2023-07-26

## 2023-07-26 RX ORDER — ATORVASTATIN CALCIUM 10 MG/1
TABLET, FILM COATED ORAL
COMMUNITY

## 2023-07-26 NOTE — PROGRESS NOTES
Subjective   Macario Aguilar is a 58 y.o. male who presents today for follow up    Chief Complaint: Panic disorder, social anxiety, anxiety      History of Present Illness: Patient presented today for follow-up.  Since last visit, patient has been doing fairly well but endorses the Elavil because of morning fatigue and he has been back on his previous Seroquel which is working well for him.  Anxiety and mood symptoms are well controlled and he is not having any other medication side effects.  He denies SI/HI/AVH.      The following portions of the patient's history were reviewed and updated as appropriate: allergies, current medications, past family history, past medical history, past social history, past surgical history and problem list.      Past Medical History:  Past Medical History:   Diagnosis Date    Anxiety        Social History:  Social History     Socioeconomic History    Marital status:    Tobacco Use    Smoking status: Never    Smokeless tobacco: Current     Types: Chew   Vaping Use    Vaping Use: Never used   Substance and Sexual Activity    Alcohol use: Never    Drug use: Never    Sexual activity: Defer       Family History:  Family History   Problem Relation Age of Onset    Anxiety disorder Mother     No Known Problems Father     Anxiety disorder Maternal Uncle        Past Surgical History:  No past surgical history on file.    Problem List:  Patient Active Problem List   Diagnosis    Type 2 diabetes mellitus, without long-term current use of insulin       Allergy:   Allergies   Allergen Reactions    Demerol [Meperidine] Hives    Penicillins Hives        Current Medications:   Current Outpatient Medications   Medication Sig Dispense Refill    albuterol sulfate  (90 Base) MCG/ACT inhaler Inhale 2 puffs Every 4 (Four) Hours As Needed for Wheezing. 18 g 0    amitriptyline (ELAVIL) 100 MG tablet Take 1 tablet by mouth At Night As Needed for Sleep. (Patient not taking: Reported on 7/26/2023)  30 tablet 2    amLODIPine (NORVASC) 5 MG tablet TAKE 1 TABLET BY MOUTH EVERY DAY FOR BLOOD PRESSURE. DOSE INCREASE.      atorvastatin (LIPITOR) 10 MG tablet TAKE 1 TABLET BY MOUTH 1 TIME EACH DAY.      BD Pen Needle Delfina U/F 32G X 4 MM misc 1 each by Other route Daily.      buprenorphine-naloxone (SUBOXONE) 8-2 MG film film Place  under the tongue Daily.      cetirizine (zyrTEC) 10 MG tablet Take 1 tablet by mouth Daily. 30 tablet 11    clonazePAM (KlonoPIN) 0.5 MG tablet Take 1 tablet by mouth 3 (Three) Times a Day As Needed for Anxiety. for anxiety 90 tablet 2    gabapentin (NEURONTIN) 800 MG tablet       Lantus 100 UNIT/ML injection INJECT 10 UNITS SUBCUTANEOUSLY EVERY NIGHT AT BEDTIME      lisinopril (PRINIVIL,ZESTRIL) 40 MG tablet       Pro Comfort Lancets 31G misc USE TO CHECK BLOOD SUGAR LEVEL THREE TIMES A DAY      QUEtiapine (SEROquel) 100 MG tablet Take 1.5 tablets by mouth Every Night.      triamcinolone (KENALOG) 0.1 % cream       Narcan 4 MG/0.1ML nasal spray  (Patient not taking: Reported on 7/26/2023)       No current facility-administered medications for this visit.       Review of Symptoms:    Review of Systems   Constitutional:  Negative for activity change (improved), chills, diaphoresis, fatigue, fever and unexpected weight loss.   HENT:  Negative for congestion, rhinorrhea, sinus pressure and sneezing.    Eyes: Negative.    Respiratory:  Positive for chest tightness. Negative for cough and shortness of breath.    Cardiovascular:  Positive for palpitations. Negative for chest pain and leg swelling.   Gastrointestinal: Negative.    Endocrine: Negative.    Genitourinary: Negative.    Musculoskeletal:  Positive for arthralgias, back pain and myalgias.   Skin: Negative.    Allergic/Immunologic: Negative.    Neurological:  Positive for tremors.   Hematological: Negative.    Psychiatric/Behavioral:  Negative for dysphoric mood and stress. The patient is not nervous/anxious.        Physical Exam:  "  Blood pressure 124/80, pulse 101, height 182.9 cm (72.01\"), weight 112 kg (247 lb 12.8 oz).      Appearance:  male of stated age in no acute distress, NAD  Gait, Station, Strength: CM of stated age, NAD     Mental Status Exam:     Mental Status exam performed 07/26/2023  and patient shows no significant changes from previous exam.     Hygiene:   good  Cooperation:  Cooperative  Eye Contact:  Good  Psychomotor Behavior:  Appropriate  Affect:  Full range  Mood: normal, stable   Hopelessness: Optimistic  Speech:  Normal  Thought Process:  Goal directed and Linear  Thought Content:  Normal and Mood congruent  Suicidal:  None  Homicidal:  None  Hallucinations:  None  Delusion:  None  Memory:  Intact  Orientation:  Person, Place, Time and Situation  Reliability:  good  Insight:  Good  Judgement:  Good  Impulse Control:  Good  Physical/Medical Issues:  Yes chronic pain from previous work related accident        Lab Results:   No visits with results within 1 Month(s) from this visit.   Latest known visit with results is:   Office Visit on 04/24/2023   Component Date Value Ref Range Status    External Amphetamine Screen Urine 04/24/2023 Negative   Final    External Benzodiazepine Screen Uri* 04/24/2023 Negative   Final    External Cocaine Screen Urine 04/24/2023 Negative   Final    External THC Screen Urine 04/24/2023 Negative   Final    External Methadone Screen Urine 04/24/2023 Negative   Final    External Methamphetamine Screen Ur* 04/24/2023 Negative   Final    External Oxycodone Screen Urine 04/24/2023 Negative   Final    External Buprenorphine Screen Urine 04/24/2023 Positive (A)   Final    External MDMA 04/24/2023 Negative   Final    External Opiates Screen Urine 04/24/2023 Negative   Final       Assessment & Plan   Diagnoses and all orders for this visit:    1. JUAN LUIS (generalized anxiety disorder) (Primary)  -     clonazePAM (KlonoPIN) 0.5 MG tablet; Take 1 tablet by mouth 3 (Three) Times a Day As Needed for " Anxiety. for anxiety  Dispense: 90 tablet; Refill: 2    2. Panic disorder with agoraphobia  -     clonazePAM (KlonoPIN) 0.5 MG tablet; Take 1 tablet by mouth 3 (Three) Times a Day As Needed for Anxiety. for anxiety  Dispense: 90 tablet; Refill: 2    3. Social anxiety disorder  -     clonazePAM (KlonoPIN) 0.5 MG tablet; Take 1 tablet by mouth 3 (Three) Times a Day As Needed for Anxiety. for anxiety  Dispense: 90 tablet; Refill: 2    4. Psychophysiological insomnia  -     clonazePAM (KlonoPIN) 0.5 MG tablet; Take 1 tablet by mouth 3 (Three) Times a Day As Needed for Anxiety. for anxiety  Dispense: 90 tablet; Refill: 2    Other orders  -     SCANNED - LABS  -     QUEtiapine (SEROquel) 100 MG tablet; Take 1.5 tablets by mouth Every Night.  Dispense: 45 tablet; Refill: 2    -Patient overall doing well.  Elavil cause excessive sedation so we will go back to Seroquel for insomnia.  -Reviewed previous available documentation  -Reviewed previous medical records and scanned to chart  -Reviewed most recent available labs  -MICHEAL reviewed and appropriate. Patient counseled on use of controlled substances.   -Restart Seroquel 150 mg nightly as needed for insomnia, anxiety  -Discontinue Elavil  -Continue clonazepam 0.5 mg p.o.  3 times daily as needed for anxiety.  Patient advised of the risks of using benzodiazepines with concurrent opioid maintenance therapy.  In weighing the risks and benefits, patient is more active and functional during the day and is also taking an active role in his day-to-day life which is a functional improvement over before.  We will continue the medication for now.  The eventual goal will be to taper off of the medication completely or have patient taper off of his Suboxone, however he has been on benzodiazepines for an extended amount of time and this may prove to be difficult. Patient will be maintained on lowest dose possible due to these issues and interactions.   -Encouraged to continue  maintenance therapy of Suboxone   -Encourage cessation of tobacco products  -Encouraged patient to consider therapy      Visit Diagnoses:    ICD-10-CM ICD-9-CM   1. JUAN LUIS (generalized anxiety disorder)  F41.1 300.02   2. Panic disorder with agoraphobia  F40.01 300.21   3. Social anxiety disorder  F40.10 300.23   4. Psychophysiological insomnia  F51.04 307.42       TREATMENT PLAN/GOALS: Continue supportive psychotherapy efforts and medications as indicated. Treatment and medication options discussed during today's visit. Patient acknowledged and verbally consented to continue with current treatment plan and was educated on the importance of compliance with treatment and follow-up appointments.    MEDICATION ISSUES:    Discussed medication options and treatment plan of prescribed medication as well as the risks, benefits, and side effects including potential falls, possible impaired driving and metabolic adversities among others. Patient is agreeable to call the office with any worsening of symptoms or onset of side effects. Patient is agreeable to call 911 or go to the nearest ER should he/she begin having SI/HI.     MEDS ORDERED DURING VISIT:  New Medications Ordered This Visit   Medications    clonazePAM (KlonoPIN) 0.5 MG tablet     Sig: Take 1 tablet by mouth 3 (Three) Times a Day As Needed for Anxiety. for anxiety     Dispense:  90 tablet     Refill:  2    QUEtiapine (SEROquel) 100 MG tablet     Sig: Take 1.5 tablets by mouth Every Night.     Dispense:  45 tablet     Refill:  2       Return in about 3 months (around 10/26/2023).             This document has been electronically signed by Yanick Coon MD  July 26, 2023 13:22 EDT

## 2023-10-17 ENCOUNTER — OFFICE VISIT (OUTPATIENT)
Dept: PSYCHIATRY | Facility: CLINIC | Age: 58
End: 2023-10-17
Payer: MEDICARE

## 2023-10-17 VITALS
OXYGEN SATURATION: 95 % | SYSTOLIC BLOOD PRESSURE: 138 MMHG | DIASTOLIC BLOOD PRESSURE: 84 MMHG | HEART RATE: 124 BPM | BODY MASS INDEX: 34.65 KG/M2 | HEIGHT: 72 IN | WEIGHT: 255.8 LBS

## 2023-10-17 DIAGNOSIS — F40.10 SOCIAL ANXIETY DISORDER: ICD-10-CM

## 2023-10-17 DIAGNOSIS — F41.1 GAD (GENERALIZED ANXIETY DISORDER): ICD-10-CM

## 2023-10-17 DIAGNOSIS — F40.01 PANIC DISORDER WITH AGORAPHOBIA: ICD-10-CM

## 2023-10-17 DIAGNOSIS — F51.04 PSYCHOPHYSIOLOGICAL INSOMNIA: ICD-10-CM

## 2023-10-17 RX ORDER — QUETIAPINE FUMARATE 100 MG/1
150 TABLET, FILM COATED ORAL NIGHTLY
Qty: 45 TABLET | Refills: 2 | Status: SHIPPED | OUTPATIENT
Start: 2023-10-17

## 2023-10-17 RX ORDER — AMITRIPTYLINE HYDROCHLORIDE 50 MG/1
50 TABLET, FILM COATED ORAL NIGHTLY PRN
COMMUNITY
Start: 2023-09-25 | End: 2023-10-17

## 2023-10-17 RX ORDER — CLONAZEPAM 1 MG/1
1 TABLET ORAL 2 TIMES DAILY PRN
Qty: 60 TABLET | Refills: 2 | Status: SHIPPED | OUTPATIENT
Start: 2023-10-17

## 2023-10-17 NOTE — PROGRESS NOTES
Subjective   Macario Aguilar is a 58 y.o. male who presents today for follow up    Chief Complaint: Panic disorder, social anxiety, anxiety      History of Present Illness: Patient presented today for follow-up.  Since last visit, patient has been relatively stable from a depression standpoint but is having some worsening anxiety and panic symptoms with 3-4 severe panic attacks at least every other week.  He is not having any medication side effects.  Sleep and appetite are stable.  He denies SI/HI/AVH.      The following portions of the patient's history were reviewed and updated as appropriate: allergies, current medications, past family history, past medical history, past social history, past surgical history and problem list.      Past Medical History:  Past Medical History:   Diagnosis Date    Anxiety        Social History:  Social History     Socioeconomic History    Marital status:    Tobacco Use    Smoking status: Never    Smokeless tobacco: Current     Types: Chew   Vaping Use    Vaping Use: Never used   Substance and Sexual Activity    Alcohol use: Never    Drug use: Never    Sexual activity: Defer       Family History:  Family History   Problem Relation Age of Onset    Anxiety disorder Mother     No Known Problems Father     Anxiety disorder Maternal Uncle        Past Surgical History:  No past surgical history on file.    Problem List:  Patient Active Problem List   Diagnosis    Type 2 diabetes mellitus, without long-term current use of insulin       Allergy:   Allergies   Allergen Reactions    Demerol [Meperidine] Hives    Penicillins Hives        Current Medications:   Current Outpatient Medications   Medication Sig Dispense Refill    albuterol sulfate  (90 Base) MCG/ACT inhaler Inhale 2 puffs Every 4 (Four) Hours As Needed for Wheezing. 18 g 0    amitriptyline (ELAVIL) 50 MG tablet Take 1 tablet by mouth At Night As Needed for Sleep.      amLODIPine (NORVASC) 5 MG tablet TAKE 1 TABLET BY  MOUTH EVERY DAY FOR BLOOD PRESSURE. DOSE INCREASE.      atorvastatin (LIPITOR) 10 MG tablet TAKE 1 TABLET BY MOUTH 1 TIME EACH DAY.      BD Pen Needle Delfina U/F 32G X 4 MM misc 1 each by Other route Daily.      buprenorphine-naloxone (SUBOXONE) 8-2 MG film film Place  under the tongue Daily.      cetirizine (zyrTEC) 10 MG tablet Take 1 tablet by mouth Daily. 30 tablet 11    clonazePAM (KlonoPIN) 0.5 MG tablet Take 1 tablet by mouth 3 (Three) Times a Day As Needed for Anxiety. for anxiety 90 tablet 2    gabapentin (NEURONTIN) 800 MG tablet       Lantus 100 UNIT/ML injection INJECT 10 UNITS SUBCUTANEOUSLY EVERY NIGHT AT BEDTIME      lisinopril (PRINIVIL,ZESTRIL) 40 MG tablet       Pro Comfort Lancets 31G misc USE TO CHECK BLOOD SUGAR LEVEL THREE TIMES A DAY      QUEtiapine (SEROquel) 100 MG tablet Take 1.5 tablets by mouth Every Night. 45 tablet 2    triamcinolone (KENALOG) 0.1 % cream       Narcan 4 MG/0.1ML nasal spray  (Patient not taking: Reported on 7/26/2023)       No current facility-administered medications for this visit.       Review of Symptoms:    Review of Systems   Constitutional:  Negative for activity change (improved), chills, diaphoresis, fatigue, fever and unexpected weight loss.   HENT:  Negative for congestion, rhinorrhea, sinus pressure and sneezing.    Eyes: Negative.    Respiratory:  Positive for chest tightness. Negative for cough and shortness of breath.    Cardiovascular:  Positive for palpitations. Negative for chest pain and leg swelling.   Gastrointestinal: Negative.    Endocrine: Negative.    Genitourinary: Negative.    Musculoskeletal:  Positive for arthralgias, back pain and myalgias.   Skin: Negative.    Allergic/Immunologic: Negative.    Neurological:  Positive for tremors.   Hematological: Negative.    Psychiatric/Behavioral:  Positive for dysphoric mood. Negative for stress. The patient is nervous/anxious.          Physical Exam:   Blood pressure 138/84, pulse (!) 124, height 182.9  "cm (72.01\"), weight 116 kg (255 lb 12.8 oz), SpO2 95%.      Appearance:  male of stated age in no acute distress, NAD  Gait, Station, Strength: CM of stated age, NAD     Mental Status Exam:         Hygiene:   good  Cooperation:  Cooperative  Eye Contact:  Good  Psychomotor Behavior:  Appropriate  Affect:  Full range  Mood: anxious and panicky  Hopelessness: Optimistic  Speech:  Normal  Thought Process:  Goal directed and Linear  Thought Content:  Normal and Mood congruent  Suicidal:  None  Homicidal:  None  Hallucinations:  None  Delusion:  None  Memory:  Intact  Orientation:  Person, Place, Time and Situation  Reliability:  good  Insight:  Good  Judgement:  Good  Impulse Control:  Good  Physical/Medical Issues:  Yes chronic pain from previous work related accident        Lab Results:   No visits with results within 1 Month(s) from this visit.   Latest known visit with results is:   Office Visit on 04/24/2023   Component Date Value Ref Range Status    External Amphetamine Screen Urine 04/24/2023 Negative   Final    External Benzodiazepine Screen Uri* 04/24/2023 Negative   Final    External Cocaine Screen Urine 04/24/2023 Negative   Final    External THC Screen Urine 04/24/2023 Negative   Final    External Methadone Screen Urine 04/24/2023 Negative   Final    External Methamphetamine Screen Ur* 04/24/2023 Negative   Final    External Oxycodone Screen Urine 04/24/2023 Negative   Final    External Buprenorphine Screen Urine 04/24/2023 Positive (A)   Final    External MDMA 04/24/2023 Negative   Final    External Opiates Screen Urine 04/24/2023 Negative   Final       Assessment & Plan   Diagnoses and all orders for this visit:    1. Panic disorder with agoraphobia  -     clonazePAM (KlonoPIN) 1 MG tablet; Take 1 tablet by mouth 2 (Two) Times a Day As Needed for Anxiety. for anxiety  Dispense: 60 tablet; Refill: 2    2. Social anxiety disorder  -     clonazePAM (KlonoPIN) 1 MG tablet; Take 1 tablet by mouth 2 " (Two) Times a Day As Needed for Anxiety. for anxiety  Dispense: 60 tablet; Refill: 2    3. JUAN LUIS (generalized anxiety disorder)  -     clonazePAM (KlonoPIN) 1 MG tablet; Take 1 tablet by mouth 2 (Two) Times a Day As Needed for Anxiety. for anxiety  Dispense: 60 tablet; Refill: 2    4. Psychophysiological insomnia  -     clonazePAM (KlonoPIN) 1 MG tablet; Take 1 tablet by mouth 2 (Two) Times a Day As Needed for Anxiety. for anxiety  Dispense: 60 tablet; Refill: 2    Other orders  -     QUEtiapine (SEROquel) 100 MG tablet; Take 1.5 tablets by mouth Every Night.  Dispense: 45 tablet; Refill: 2      -Patient having some worsening anxiety and panic attack frequency  -Reviewed previous available documentation  -Reviewed previous medical records and scanned to chart  -Reviewed most recent available labs  -MICHEAL reviewed and appropriate. Patient counseled on use of controlled substances.   -Continue Seroquel 150 mg nightly as needed for insomnia, anxiety  -Change clonazepam 0.5 mg p.o.  3 times daily to 1 mg twice daily as needed for anxiety.  Patient advised of the risks of using benzodiazepines with concurrent opioid maintenance therapy.  In weighing the risks and benefits, patient is more active and functional during the day and is also taking an active role in his day-to-day life which is a functional improvement over before.  We will continue the medication for now.  The eventual goal will be to taper off of the medication completely or have patient taper off of his Suboxone, however he has been on benzodiazepines for an extended amount of time and this may prove to be difficult. Patient will be maintained on lowest dose possible due to these issues and interactions.   -Encouraged to continue maintenance therapy of Suboxone   -Encourage cessation of tobacco products  -Encouraged patient to consider therapy      Visit Diagnoses:    ICD-10-CM ICD-9-CM   1. Panic disorder with agoraphobia  F40.01 300.21   2. Social anxiety  disorder  F40.10 300.23   3. JUAN LUIS (generalized anxiety disorder)  F41.1 300.02   4. Psychophysiological insomnia  F51.04 307.42         TREATMENT PLAN/GOALS: Continue supportive psychotherapy efforts and medications as indicated. Treatment and medication options discussed during today's visit. Patient acknowledged and verbally consented to continue with current treatment plan and was educated on the importance of compliance with treatment and follow-up appointments.    MEDICATION ISSUES:    Discussed medication options and treatment plan of prescribed medication as well as the risks, benefits, and side effects including potential falls, possible impaired driving and metabolic adversities among others. Patient is agreeable to call the office with any worsening of symptoms or onset of side effects. Patient is agreeable to call 911 or go to the nearest ER should he/she begin having SI/HI.     MEDS ORDERED DURING VISIT:  New Medications Ordered This Visit   Medications    clonazePAM (KlonoPIN) 1 MG tablet     Sig: Take 1 tablet by mouth 2 (Two) Times a Day As Needed for Anxiety. for anxiety     Dispense:  60 tablet     Refill:  2    QUEtiapine (SEROquel) 100 MG tablet     Sig: Take 1.5 tablets by mouth Every Night.     Dispense:  45 tablet     Refill:  2       Return in about 3 months (around 1/17/2024).             This document has been electronically signed by Yanick Coon MD  October 17, 2023 13:58 EDT

## 2024-01-11 DIAGNOSIS — F40.01 PANIC DISORDER WITH AGORAPHOBIA: ICD-10-CM

## 2024-01-11 DIAGNOSIS — F41.1 GAD (GENERALIZED ANXIETY DISORDER): ICD-10-CM

## 2024-01-11 DIAGNOSIS — F51.04 PSYCHOPHYSIOLOGICAL INSOMNIA: ICD-10-CM

## 2024-01-11 DIAGNOSIS — F40.10 SOCIAL ANXIETY DISORDER: ICD-10-CM

## 2024-01-11 RX ORDER — CLONAZEPAM 1 MG/1
1 TABLET ORAL 2 TIMES DAILY PRN
Qty: 60 TABLET | Refills: 2 | Status: SHIPPED | OUTPATIENT
Start: 2024-01-11

## 2024-01-23 ENCOUNTER — OFFICE VISIT (OUTPATIENT)
Dept: PSYCHIATRY | Facility: CLINIC | Age: 59
End: 2024-01-23
Payer: MEDICARE

## 2024-01-23 VITALS
WEIGHT: 234 LBS | HEIGHT: 72 IN | SYSTOLIC BLOOD PRESSURE: 143 MMHG | HEART RATE: 99 BPM | BODY MASS INDEX: 31.69 KG/M2 | DIASTOLIC BLOOD PRESSURE: 91 MMHG

## 2024-01-23 DIAGNOSIS — F40.01 PANIC DISORDER WITH AGORAPHOBIA: ICD-10-CM

## 2024-01-23 DIAGNOSIS — F17.220 NICOTINE DEPENDENCE, CHEWING TOBACCO, UNCOMPLICATED: ICD-10-CM

## 2024-01-23 DIAGNOSIS — F40.10 SOCIAL ANXIETY DISORDER: ICD-10-CM

## 2024-01-23 DIAGNOSIS — F51.04 PSYCHOPHYSIOLOGICAL INSOMNIA: ICD-10-CM

## 2024-01-23 DIAGNOSIS — F41.1 GAD (GENERALIZED ANXIETY DISORDER): Primary | ICD-10-CM

## 2024-01-23 RX ORDER — CLONAZEPAM 1 MG/1
1 TABLET ORAL 2 TIMES DAILY PRN
Qty: 60 TABLET | Refills: 2 | Status: SHIPPED | OUTPATIENT
Start: 2024-01-23

## 2024-01-23 RX ORDER — QUETIAPINE FUMARATE 100 MG/1
150 TABLET, FILM COATED ORAL NIGHTLY
Qty: 45 TABLET | Refills: 2 | Status: SHIPPED | OUTPATIENT
Start: 2024-01-23

## 2024-01-23 RX ORDER — AMITRIPTYLINE HYDROCHLORIDE 100 MG/1
100 TABLET ORAL NIGHTLY PRN
COMMUNITY
Start: 2023-12-11

## 2024-01-23 NOTE — PROGRESS NOTES
Subjective   Macario Aguilar is a 58 y.o. male who presents today for follow up    Chief Complaint: Panic disorder, social anxiety, anxiety      History of Present Illness: Patient presented today for follow-up.  Since last visit, he reports that he has been doing fairly well.  He feels that anxiety symptoms are well-controlled with current medications.  He is not having any medication side effects.  He denies any major depressive symptoms.  He reports sleep and appetite are stable.      The following portions of the patient's history were reviewed and updated as appropriate: allergies, current medications, past family history, past medical history, past social history, past surgical history and problem list.      Past Medical History:  Past Medical History:   Diagnosis Date    Anxiety        Social History:  Social History     Socioeconomic History    Marital status:    Tobacco Use    Smoking status: Never    Smokeless tobacco: Current     Types: Chew   Vaping Use    Vaping Use: Never used   Substance and Sexual Activity    Alcohol use: Never    Drug use: Never    Sexual activity: Defer       Family History:  Family History   Problem Relation Age of Onset    Anxiety disorder Mother     No Known Problems Father     Anxiety disorder Maternal Uncle        Past Surgical History:  No past surgical history on file.    Problem List:  Patient Active Problem List   Diagnosis    Type 2 diabetes mellitus, without long-term current use of insulin       Allergy:   Allergies   Allergen Reactions    Demerol [Meperidine] Hives    Penicillins Hives        Current Medications:   Current Outpatient Medications   Medication Sig Dispense Refill    albuterol sulfate  (90 Base) MCG/ACT inhaler Inhale 2 puffs Every 4 (Four) Hours As Needed for Wheezing. 18 g 0    amitriptyline (ELAVIL) 100 MG tablet Take 1 tablet by mouth At Night As Needed for Sleep.      amLODIPine (NORVASC) 5 MG tablet TAKE 1 TABLET BY MOUTH EVERY DAY FOR  "BLOOD PRESSURE. DOSE INCREASE.      atorvastatin (LIPITOR) 10 MG tablet TAKE 1 TABLET BY MOUTH 1 TIME EACH DAY.      BD Pen Needle Delfina U/F 32G X 4 MM misc 1 each by Other route Daily.      buprenorphine-naloxone (SUBOXONE) 8-2 MG film film Place  under the tongue Daily.      cetirizine (zyrTEC) 10 MG tablet Take 1 tablet by mouth Daily. 30 tablet 11    clonazePAM (KlonoPIN) 1 MG tablet Take 1 tablet by mouth 2 (Two) Times a Day As Needed for Anxiety. for anxiety 60 tablet 2    gabapentin (NEURONTIN) 800 MG tablet       Lantus 100 UNIT/ML injection INJECT 10 UNITS SUBCUTANEOUSLY EVERY NIGHT AT BEDTIME      lisinopril (PRINIVIL,ZESTRIL) 40 MG tablet       Pro Comfort Lancets 31G misc USE TO CHECK BLOOD SUGAR LEVEL THREE TIMES A DAY      QUEtiapine (SEROquel) 100 MG tablet Take 1.5 tablets by mouth Every Night. 45 tablet 2    triamcinolone (KENALOG) 0.1 % cream       Narcan 4 MG/0.1ML nasal spray  (Patient not taking: Reported on 7/26/2023)       No current facility-administered medications for this visit.       Review of Symptoms:    Review of Systems   Constitutional:  Negative for activity change (improved), chills, diaphoresis, fatigue, fever and unexpected weight loss.   HENT:  Negative for congestion, rhinorrhea, sinus pressure and sneezing.    Eyes: Negative.    Respiratory:  Positive for chest tightness. Negative for cough and shortness of breath.    Cardiovascular:  Positive for palpitations. Negative for chest pain and leg swelling.   Gastrointestinal: Negative.    Endocrine: Negative.    Genitourinary: Negative.    Musculoskeletal:  Positive for arthralgias, back pain and myalgias.   Skin: Negative.    Allergic/Immunologic: Negative.    Neurological:  Positive for tremors.   Hematological: Negative.    Psychiatric/Behavioral:  Negative for dysphoric mood and stress. The patient is nervous/anxious.          Physical Exam:   Blood pressure 143/91, pulse 99, height 182.9 cm (72.01\"), weight 106 kg (234 lb).  "     Appearance:  male of stated age in no acute distress, NAD  Gait, Station, Strength: CM of stated age, NAD     Mental Status Exam:         Hygiene:   good  Cooperation:  Cooperative  Eye Contact:  Good  Psychomotor Behavior:  Appropriate  Affect:  Full range  Mood: normal  Hopelessness: Optimistic  Speech:  Normal  Thought Process:  Goal directed and Linear  Thought Content:  Normal and Mood congruent  Suicidal:  None  Homicidal:  None  Hallucinations:  None  Delusion:  None  Memory:  Intact  Orientation:  Person, Place, Time and Situation  Reliability:  good  Insight:  Good  Judgement:  Good  Impulse Control:  Good  Physical/Medical Issues:  Yes chronic pain from previous work related accident        Lab Results:   No visits with results within 1 Month(s) from this visit.   Latest known visit with results is:   Office Visit on 04/24/2023   Component Date Value Ref Range Status    External Amphetamine Screen Urine 04/24/2023 Negative   Final    External Benzodiazepine Screen Uri* 04/24/2023 Negative   Final    External Cocaine Screen Urine 04/24/2023 Negative   Final    External THC Screen Urine 04/24/2023 Negative   Final    External Methadone Screen Urine 04/24/2023 Negative   Final    External Methamphetamine Screen Ur* 04/24/2023 Negative   Final    External Oxycodone Screen Urine 04/24/2023 Negative   Final    External Buprenorphine Screen Urine 04/24/2023 Positive (A)   Final    External MDMA 04/24/2023 Negative   Final    External Opiates Screen Urine 04/24/2023 Negative   Final       Assessment & Plan   Diagnoses and all orders for this visit:    1. JUAN LUIS (generalized anxiety disorder) (Primary)  -     clonazePAM (KlonoPIN) 1 MG tablet; Take 1 tablet by mouth 2 (Two) Times a Day As Needed for Anxiety. for anxiety  Dispense: 60 tablet; Refill: 2  -     QUEtiapine (SEROquel) 100 MG tablet; Take 1.5 tablets by mouth Every Night.  Dispense: 45 tablet; Refill: 2    2. Panic disorder with agoraphobia  -      clonazePAM (KlonoPIN) 1 MG tablet; Take 1 tablet by mouth 2 (Two) Times a Day As Needed for Anxiety. for anxiety  Dispense: 60 tablet; Refill: 2    3. Social anxiety disorder  -     clonazePAM (KlonoPIN) 1 MG tablet; Take 1 tablet by mouth 2 (Two) Times a Day As Needed for Anxiety. for anxiety  Dispense: 60 tablet; Refill: 2    4. Psychophysiological insomnia  -     clonazePAM (KlonoPIN) 1 MG tablet; Take 1 tablet by mouth 2 (Two) Times a Day As Needed for Anxiety. for anxiety  Dispense: 60 tablet; Refill: 2  -     QUEtiapine (SEROquel) 100 MG tablet; Take 1.5 tablets by mouth Every Night.  Dispense: 45 tablet; Refill: 2    5. Nicotine dependence, chewing tobacco, uncomplicated        -Patient stable and doing well.  No major issues noted today.  -Reviewed previous available documentation  -Reviewed previous medical records and scanned to chart  -Reviewed most recent available labs  -MICHEAL reviewed and appropriate. Patient counseled on use of controlled substances.   -Continue Seroquel 150 mg nightly as needed for insomnia, anxiety  -Continue clonazepam 1 mg twice daily as needed for anxiety.  Patient advised of the risks of using benzodiazepines with concurrent opioid maintenance therapy.  In weighing the risks and benefits, patient is more active and functional during the day and is also taking an active role in his day-to-day life which is a functional improvement over before.  We will continue the medication for now.  The eventual goal will be to taper off of the medication completely or have patient taper off of his Suboxone, however he has been on benzodiazepines for an extended amount of time and this may prove to be difficult. Patient will be maintained on lowest dose possible due to these issues and interactions.   -Encouraged to continue maintenance therapy of Suboxone   -Encourage cessation of tobacco products  -Encouraged patient to consider therapy      Visit Diagnoses:    ICD-10-CM ICD-9-CM   1.  JUAN LUIS (generalized anxiety disorder)  F41.1 300.02   2. Panic disorder with agoraphobia  F40.01 300.21   3. Social anxiety disorder  F40.10 300.23   4. Psychophysiological insomnia  F51.04 307.42   5. Nicotine dependence, chewing tobacco, uncomplicated  F17.220 305.1           TREATMENT PLAN/GOALS: Continue supportive psychotherapy efforts and medications as indicated. Treatment and medication options discussed during today's visit. Patient acknowledged and verbally consented to continue with current treatment plan and was educated on the importance of compliance with treatment and follow-up appointments.    MEDICATION ISSUES:    Discussed medication options and treatment plan of prescribed medication as well as the risks, benefits, and side effects including potential falls, possible impaired driving and metabolic adversities among others. Patient is agreeable to call the office with any worsening of symptoms or onset of side effects. Patient is agreeable to call 911 or go to the nearest ER should he/she begin having SI/HI.     MEDS ORDERED DURING VISIT:  New Medications Ordered This Visit   Medications    clonazePAM (KlonoPIN) 1 MG tablet     Sig: Take 1 tablet by mouth 2 (Two) Times a Day As Needed for Anxiety. for anxiety     Dispense:  60 tablet     Refill:  2    QUEtiapine (SEROquel) 100 MG tablet     Sig: Take 1.5 tablets by mouth Every Night.     Dispense:  45 tablet     Refill:  2       Return in about 3 months (around 4/23/2024).             This document has been electronically signed by Yanick Coon MD  January 23, 2024 10:38 EST

## 2024-04-23 ENCOUNTER — OFFICE VISIT (OUTPATIENT)
Dept: PSYCHIATRY | Facility: CLINIC | Age: 59
End: 2024-04-23
Payer: MEDICARE

## 2024-04-23 VITALS
SYSTOLIC BLOOD PRESSURE: 120 MMHG | WEIGHT: 239 LBS | DIASTOLIC BLOOD PRESSURE: 80 MMHG | HEIGHT: 72 IN | OXYGEN SATURATION: 98 % | HEART RATE: 114 BPM | BODY MASS INDEX: 32.37 KG/M2

## 2024-04-23 DIAGNOSIS — F40.01 PANIC DISORDER WITH AGORAPHOBIA: ICD-10-CM

## 2024-04-23 DIAGNOSIS — F40.10 SOCIAL ANXIETY DISORDER: ICD-10-CM

## 2024-04-23 DIAGNOSIS — Z79.899 MEDICATION MANAGEMENT: ICD-10-CM

## 2024-04-23 DIAGNOSIS — F51.04 PSYCHOPHYSIOLOGICAL INSOMNIA: ICD-10-CM

## 2024-04-23 DIAGNOSIS — F41.1 GAD (GENERALIZED ANXIETY DISORDER): Primary | ICD-10-CM

## 2024-04-23 PROCEDURE — 1159F MED LIST DOCD IN RCRD: CPT | Performed by: PSYCHIATRY & NEUROLOGY

## 2024-04-23 PROCEDURE — 99214 OFFICE O/P EST MOD 30 MIN: CPT | Performed by: PSYCHIATRY & NEUROLOGY

## 2024-04-23 PROCEDURE — 1160F RVW MEDS BY RX/DR IN RCRD: CPT | Performed by: PSYCHIATRY & NEUROLOGY

## 2024-04-23 RX ORDER — AMITRIPTYLINE HYDROCHLORIDE 100 MG/1
100 TABLET ORAL NIGHTLY PRN
Qty: 30 TABLET | Refills: 2 | Status: SHIPPED | OUTPATIENT
Start: 2024-04-23

## 2024-04-23 RX ORDER — CLONAZEPAM 1 MG/1
1 TABLET ORAL 2 TIMES DAILY PRN
Qty: 60 TABLET | Refills: 2 | Status: SHIPPED | OUTPATIENT
Start: 2024-04-23

## 2024-04-23 NOTE — PROGRESS NOTES
Subjective   Macario Aguilar is a 58 y.o. male who presents today for follow up    Chief Complaint: Panic disorder, social anxiety, anxiety      History of Present Illness: Patient presented today for follow-up.  Since last visit, patient has remained stable from mental health standpoint.  He reports no major mood or anxiety symptoms and feels that symptoms are well-regulated and controlled with medications.  He is not having any major medication side effects but does endorse that Seroquel makes him sleep very heavily and he often acts out his dreams to the point of moving through the house or having conversations and would like to go back on his Elavil.  He got a new dog which has been helpful to mood.  He denies SI/HI/AVH.  He currently does have some GI symptoms with nausea and diaphoresis and difficulty holding anything down but water.    The following portions of the patient's history were reviewed and updated as appropriate: allergies, current medications, past family history, past medical history, past social history, past surgical history and problem list.      Past Medical History:  Past Medical History:   Diagnosis Date    Anxiety        Social History:  Social History     Socioeconomic History    Marital status:    Tobacco Use    Smoking status: Never    Smokeless tobacco: Current     Types: Chew   Vaping Use    Vaping status: Never Used   Substance and Sexual Activity    Alcohol use: Never    Drug use: Never    Sexual activity: Defer       Family History:  Family History   Problem Relation Age of Onset    Anxiety disorder Mother     No Known Problems Father     Anxiety disorder Maternal Uncle        Past Surgical History:  No past surgical history on file.    Problem List:  Patient Active Problem List   Diagnosis    Type 2 diabetes mellitus, without long-term current use of insulin       Allergy:   Allergies   Allergen Reactions    Demerol [Meperidine] Hives    Penicillins Hives        Current  Medications:   Current Outpatient Medications   Medication Sig Dispense Refill    albuterol sulfate  (90 Base) MCG/ACT inhaler Inhale 2 puffs Every 4 (Four) Hours As Needed for Wheezing. 18 g 0    amitriptyline (ELAVIL) 100 MG tablet Take 1 tablet by mouth At Night As Needed for Sleep.      amLODIPine (NORVASC) 5 MG tablet TAKE 1 TABLET BY MOUTH EVERY DAY FOR BLOOD PRESSURE. DOSE INCREASE.      atorvastatin (LIPITOR) 10 MG tablet TAKE 1 TABLET BY MOUTH 1 TIME EACH DAY.      BD Pen Needle Delfina U/F 32G X 4 MM misc 1 each by Other route Daily.      buprenorphine-naloxone (SUBOXONE) 8-2 MG film film Place  under the tongue Daily.      cetirizine (zyrTEC) 10 MG tablet Take 1 tablet by mouth Daily. 30 tablet 11    clonazePAM (KlonoPIN) 1 MG tablet Take 1 tablet by mouth 2 (Two) Times a Day As Needed for Anxiety. for anxiety 60 tablet 2    gabapentin (NEURONTIN) 800 MG tablet       Lantus 100 UNIT/ML injection INJECT 10 UNITS SUBCUTANEOUSLY EVERY NIGHT AT BEDTIME      lisinopril (PRINIVIL,ZESTRIL) 40 MG tablet       Narcan 4 MG/0.1ML nasal spray       Pro Comfort Lancets 31G misc USE TO CHECK BLOOD SUGAR LEVEL THREE TIMES A DAY      QUEtiapine (SEROquel) 100 MG tablet Take 1.5 tablets by mouth Every Night. 45 tablet 2    triamcinolone (KENALOG) 0.1 % cream        No current facility-administered medications for this visit.       Review of Symptoms:    Review of Systems   Constitutional:  Positive for activity change (improved), chills and diaphoresis. Negative for fatigue, fever and unexpected weight loss.   HENT:  Negative for congestion, rhinorrhea, sinus pressure and sneezing.    Eyes: Negative.    Respiratory:  Positive for chest tightness. Negative for cough and shortness of breath.    Cardiovascular:  Positive for palpitations. Negative for chest pain and leg swelling.   Gastrointestinal:  Positive for nausea and vomiting.   Endocrine: Negative.    Genitourinary: Negative.    Musculoskeletal:  Positive for  "arthralgias, back pain and myalgias.   Skin: Negative.    Allergic/Immunologic: Negative.    Neurological:  Positive for tremors.   Hematological: Negative.    Psychiatric/Behavioral:  Negative for dysphoric mood and stress. The patient is nervous/anxious.          Physical Exam:   Blood pressure 120/80, pulse 114, height 182.9 cm (72.01\"), weight 108 kg (239 lb), SpO2 98%.      Appearance:  male of stated age in no acute distress, NAD  Gait, Station, Strength: CM of stated age, NAD, wearing mask due to virus    Mental Status Exam:     Mental Status exam performed 04/23/2024  and patient shows no significant changes from previous exam.     Hygiene:   good  Cooperation:  Cooperative  Eye Contact:  Good  Psychomotor Behavior:  Appropriate  Affect:  Full range  Mood: normal  Hopelessness: Optimistic  Speech:  Normal  Thought Process:  Goal directed and Linear  Thought Content:  Normal and Mood congruent  Suicidal:  None  Homicidal:  None  Hallucinations:  None  Delusion:  None  Memory:  Intact  Orientation:  Person, Place, Time and Situation  Reliability:  good  Insight:  Good  Judgement:  Good  Impulse Control:  Good  Physical/Medical Issues:  Yes chronic pain from previous work related accident        Lab Results:   No visits with results within 1 Month(s) from this visit.   Latest known visit with results is:   Office Visit on 04/24/2023   Component Date Value Ref Range Status    External Amphetamine Screen Urine 04/24/2023 Negative   Final    External Benzodiazepine Screen Uri* 04/24/2023 Negative   Final    External Cocaine Screen Urine 04/24/2023 Negative   Final    External THC Screen Urine 04/24/2023 Negative   Final    External Methadone Screen Urine 04/24/2023 Negative   Final    External Methamphetamine Screen Ur* 04/24/2023 Negative   Final    External Oxycodone Screen Urine 04/24/2023 Negative   Final    External Buprenorphine Screen Urine 04/24/2023 Positive (A)   Final    External MDMA " 04/24/2023 Negative   Final    External Opiates Screen Urine 04/24/2023 Negative   Final       Assessment & Plan   Diagnoses and all orders for this visit:    1. JUAN LUIS (generalized anxiety disorder) (Primary)  -     clonazePAM (KlonoPIN) 1 MG tablet; Take 1 tablet by mouth 2 (Two) Times a Day As Needed for Anxiety. for anxiety  Dispense: 60 tablet; Refill: 2    2. Medication management  -     [facility] Screen    3. Panic disorder with agoraphobia  -     clonazePAM (KlonoPIN) 1 MG tablet; Take 1 tablet by mouth 2 (Two) Times a Day As Needed for Anxiety. for anxiety  Dispense: 60 tablet; Refill: 2    4. Social anxiety disorder  -     clonazePAM (KlonoPIN) 1 MG tablet; Take 1 tablet by mouth 2 (Two) Times a Day As Needed for Anxiety. for anxiety  Dispense: 60 tablet; Refill: 2    5. Psychophysiological insomnia  -     clonazePAM (KlonoPIN) 1 MG tablet; Take 1 tablet by mouth 2 (Two) Times a Day As Needed for Anxiety. for anxiety  Dispense: 60 tablet; Refill: 2  -     amitriptyline (ELAVIL) 100 MG tablet; Take 1 tablet by mouth At Night As Needed for Sleep.  Dispense: 30 tablet; Refill: 2        -Patient overall stable and doing well but is having some heavy sleep and acting out his sleep behaviors with Seroquel and would like to go back on Elavil.  No other issues noted today.  -Reviewed previous available documentation  -Reviewed previous medical records and scanned to chart  -Reviewed most recent available labs  -MICHEAL reviewed and appropriate. Patient counseled on use of controlled substances.   -Discontinue Seroquel 150 mg nightly as needed for insomnia, anxiety  -Restart amitriptyline 100 mg nightly as needed for insomnia  -Continue clonazepam 1 mg twice daily as needed for anxiety.  Patient advised of the risks of using benzodiazepines with concurrent opioid maintenance therapy.  In weighing the risks and benefits, patient is more active and functional during the day and is also taking an active role in his  day-to-day life which is a functional improvement over before.  We will continue the medication for now.  The eventual goal will be to taper off of the medication completely or have patient taper off of his Suboxone, however he has been on benzodiazepines for an extended amount of time and this may prove to be difficult. Patient will be maintained on lowest dose possible due to these issues and interactions.   -Encouraged to continue maintenance therapy of Suboxone   -Encourage cessation of tobacco products  -Encouraged patient to consider therapy      Visit Diagnoses:    ICD-10-CM ICD-9-CM   1. JUAN LUIS (generalized anxiety disorder)  F41.1 300.02   2. Medication management  Z79.899 V58.69   3. Panic disorder with agoraphobia  F40.01 300.21   4. Social anxiety disorder  F40.10 300.23   5. Psychophysiological insomnia  F51.04 307.42           TREATMENT PLAN/GOALS: Continue supportive psychotherapy efforts and medications as indicated. Treatment and medication options discussed during today's visit. Patient acknowledged and verbally consented to continue with current treatment plan and was educated on the importance of compliance with treatment and follow-up appointments.    MEDICATION ISSUES:    Discussed medication options and treatment plan of prescribed medication as well as the risks, benefits, and side effects including potential falls, possible impaired driving and metabolic adversities among others. Patient is agreeable to call the office with any worsening of symptoms or onset of side effects. Patient is agreeable to call 911 or go to the nearest ER should he/she begin having SI/HI.     MEDS ORDERED DURING VISIT:  New Medications Ordered This Visit   Medications    clonazePAM (KlonoPIN) 1 MG tablet     Sig: Take 1 tablet by mouth 2 (Two) Times a Day As Needed for Anxiety. for anxiety     Dispense:  60 tablet     Refill:  2    amitriptyline (ELAVIL) 100 MG tablet     Sig: Take 1 tablet by mouth At Night As Needed  for Sleep.     Dispense:  30 tablet     Refill:  2       Return in about 3 months (around 7/23/2024).             This document has been electronically signed by Yanick Coon MD  April 23, 2024 13:18 EDT

## 2024-05-03 DIAGNOSIS — F40.01 PANIC DISORDER WITH AGORAPHOBIA: ICD-10-CM

## 2024-05-03 DIAGNOSIS — F41.1 GAD (GENERALIZED ANXIETY DISORDER): ICD-10-CM

## 2024-05-03 DIAGNOSIS — F40.10 SOCIAL ANXIETY DISORDER: ICD-10-CM

## 2024-05-03 DIAGNOSIS — F51.04 PSYCHOPHYSIOLOGICAL INSOMNIA: ICD-10-CM

## 2024-05-03 RX ORDER — AMITRIPTYLINE HYDROCHLORIDE 100 MG/1
100 TABLET ORAL NIGHTLY PRN
Qty: 30 TABLET | Refills: 2 | Status: SHIPPED | OUTPATIENT
Start: 2024-05-03

## 2024-05-03 RX ORDER — CLONAZEPAM 1 MG/1
1 TABLET ORAL 2 TIMES DAILY PRN
Qty: 60 TABLET | Refills: 0 | Status: SHIPPED | OUTPATIENT
Start: 2024-05-03

## 2024-05-03 NOTE — TELEPHONE ENCOUNTER
Dr Law patient. Requesting medications be cancelled and transferred to [facility] due to shortage. Can you please help with this request

## 2024-05-28 DIAGNOSIS — F40.10 SOCIAL ANXIETY DISORDER: ICD-10-CM

## 2024-05-28 DIAGNOSIS — F41.1 GAD (GENERALIZED ANXIETY DISORDER): ICD-10-CM

## 2024-05-28 DIAGNOSIS — F40.01 PANIC DISORDER WITH AGORAPHOBIA: ICD-10-CM

## 2024-05-28 DIAGNOSIS — F51.04 PSYCHOPHYSIOLOGICAL INSOMNIA: ICD-10-CM

## 2024-05-28 RX ORDER — CLONAZEPAM 1 MG/1
1 TABLET ORAL 2 TIMES DAILY PRN
Qty: 60 TABLET | Refills: 0 | Status: SHIPPED | OUTPATIENT
Start: 2024-05-28

## 2024-06-20 DIAGNOSIS — F41.1 GAD (GENERALIZED ANXIETY DISORDER): ICD-10-CM

## 2024-06-20 DIAGNOSIS — F51.04 PSYCHOPHYSIOLOGICAL INSOMNIA: ICD-10-CM

## 2024-06-20 DIAGNOSIS — F40.10 SOCIAL ANXIETY DISORDER: ICD-10-CM

## 2024-06-20 DIAGNOSIS — F40.01 PANIC DISORDER WITH AGORAPHOBIA: ICD-10-CM

## 2024-06-21 RX ORDER — CLONAZEPAM 1 MG/1
1 TABLET ORAL 2 TIMES DAILY PRN
Qty: 60 TABLET | Refills: 0 | Status: SHIPPED | OUTPATIENT
Start: 2024-06-21

## 2024-07-23 ENCOUNTER — OFFICE VISIT (OUTPATIENT)
Dept: PSYCHIATRY | Facility: CLINIC | Age: 59
End: 2024-07-23
Payer: MEDICARE

## 2024-07-23 VITALS
DIASTOLIC BLOOD PRESSURE: 80 MMHG | WEIGHT: 246.2 LBS | HEART RATE: 115 BPM | OXYGEN SATURATION: 97 % | BODY MASS INDEX: 33.35 KG/M2 | HEIGHT: 72 IN | SYSTOLIC BLOOD PRESSURE: 128 MMHG

## 2024-07-23 DIAGNOSIS — F41.1 GAD (GENERALIZED ANXIETY DISORDER): Primary | ICD-10-CM

## 2024-07-23 DIAGNOSIS — F51.04 PSYCHOPHYSIOLOGICAL INSOMNIA: ICD-10-CM

## 2024-07-23 DIAGNOSIS — F40.01 PANIC DISORDER WITH AGORAPHOBIA: ICD-10-CM

## 2024-07-23 DIAGNOSIS — F40.10 SOCIAL ANXIETY DISORDER: ICD-10-CM

## 2024-07-23 PROCEDURE — 99214 OFFICE O/P EST MOD 30 MIN: CPT | Performed by: PSYCHIATRY & NEUROLOGY

## 2024-07-23 PROCEDURE — 1160F RVW MEDS BY RX/DR IN RCRD: CPT | Performed by: PSYCHIATRY & NEUROLOGY

## 2024-07-23 PROCEDURE — 1159F MED LIST DOCD IN RCRD: CPT | Performed by: PSYCHIATRY & NEUROLOGY

## 2024-07-23 RX ORDER — CLONAZEPAM 1 MG/1
1 TABLET ORAL 2 TIMES DAILY PRN
Qty: 60 TABLET | Refills: 2 | Status: SHIPPED | OUTPATIENT
Start: 2024-07-23

## 2024-07-23 RX ORDER — AMITRIPTYLINE HYDROCHLORIDE 100 MG/1
100 TABLET ORAL NIGHTLY PRN
Qty: 30 TABLET | Refills: 2 | Status: SHIPPED | OUTPATIENT
Start: 2024-07-23

## 2024-07-23 RX ORDER — QUETIAPINE FUMARATE 100 MG/1
1.5 TABLET, FILM COATED ORAL NIGHTLY
COMMUNITY
Start: 2024-07-12 | End: 2024-07-23

## 2024-07-23 RX ORDER — SILDENAFIL CITRATE 20 MG/1
20 TABLET ORAL
COMMUNITY
Start: 2024-07-12

## 2024-07-23 NOTE — PROGRESS NOTES
Subjective   Macario Aguilar is a 59 y.o. male who presents today for follow up    Chief Complaint: Panic disorder, social anxiety, anxiety      History of Present Illness: Patient presented today for follow-up.  Since last visit, patient has been fairly stable.  Mood and anxiety symptoms are fairly well-controlled with current medication regimen and he is not having any major medication side effects.  Sleep and appetite are stable.  He still has panic symptoms but they are less severe and less frequent.  He denies SI/HI/AVH.  He has been playing with his new puppy who is teething so patient does have some forearm abrasions that are superficial in nature.  He denies SI/HI/AVH.    The following portions of the patient's history were reviewed and updated as appropriate: allergies, current medications, past family history, past medical history, past social history, past surgical history and problem list.      Past Medical History:  Past Medical History:   Diagnosis Date    Anxiety        Social History:  Social History     Socioeconomic History    Marital status:    Tobacco Use    Smoking status: Never    Smokeless tobacco: Current     Types: Chew   Vaping Use    Vaping status: Never Used   Substance and Sexual Activity    Alcohol use: Never    Drug use: Never    Sexual activity: Defer       Family History:  Family History   Problem Relation Age of Onset    Anxiety disorder Mother     No Known Problems Father     Anxiety disorder Maternal Uncle        Past Surgical History:  No past surgical history on file.    Problem List:  Patient Active Problem List   Diagnosis    Type 2 diabetes mellitus, without long-term current use of insulin       Allergy:   Allergies   Allergen Reactions    Demerol [Meperidine] Hives    Penicillins Hives        Current Medications:   Current Outpatient Medications   Medication Sig Dispense Refill    albuterol sulfate  (90 Base) MCG/ACT inhaler Inhale 2 puffs Every 4 (Four) Hours  As Needed for Wheezing. 18 g 0    amitriptyline (ELAVIL) 100 MG tablet Take 1 tablet by mouth At Night As Needed for Sleep. 30 tablet 2    amLODIPine (NORVASC) 5 MG tablet TAKE 1 TABLET BY MOUTH EVERY DAY FOR BLOOD PRESSURE. DOSE INCREASE.      atorvastatin (LIPITOR) 10 MG tablet TAKE 1 TABLET BY MOUTH 1 TIME EACH DAY.      BD Pen Needle Delfina U/F 32G X 4 MM misc 1 each by Other route Daily.      buprenorphine-naloxone (SUBOXONE) 8-2 MG film film Place  under the tongue Daily.      clonazePAM (KlonoPIN) 1 MG tablet Take 1 tablet by mouth 2 (Two) Times a Day As Needed for Anxiety. for anxiety 60 tablet 0    gabapentin (NEURONTIN) 800 MG tablet       Lantus 100 UNIT/ML injection INJECT 10 UNITS SUBCUTANEOUSLY EVERY NIGHT AT BEDTIME      lisinopril (PRINIVIL,ZESTRIL) 40 MG tablet       Narcan 4 MG/0.1ML nasal spray       Pro Comfort Lancets 31G misc USE TO CHECK BLOOD SUGAR LEVEL THREE TIMES A DAY      QUEtiapine (SEROquel) 100 MG tablet Take 1.5 tablets by mouth Every Night.      sildenafil (REVATIO) 20 MG tablet Take 1 tablet by mouth.      triamcinolone (KENALOG) 0.1 % cream       cetirizine (zyrTEC) 10 MG tablet Take 1 tablet by mouth Daily. 30 tablet 11     No current facility-administered medications for this visit.       Review of Symptoms:    Review of Systems   Constitutional:  Negative for activity change (improved), chills, diaphoresis, fatigue, fever and unexpected weight loss.   HENT:  Negative for congestion, rhinorrhea, sinus pressure and sneezing.    Eyes: Negative.    Respiratory:  Positive for chest tightness. Negative for cough and shortness of breath.    Cardiovascular:  Positive for palpitations. Negative for chest pain and leg swelling.   Gastrointestinal:  Negative for nausea and vomiting.   Endocrine: Negative.    Genitourinary: Negative.    Musculoskeletal:  Positive for arthralgias, back pain and myalgias.   Skin: Negative.    Allergic/Immunologic: Negative.    Neurological:  Positive for  "tremors.   Hematological: Negative.    Psychiatric/Behavioral:  Negative for dysphoric mood and stress. The patient is nervous/anxious.          Physical Exam:   Blood pressure 128/80, pulse 115, height 182.9 cm (72.01\"), weight 112 kg (246 lb 3.2 oz), SpO2 97%.      Appearance:  male of stated age in no acute distress, NAD  Gait, Station, Strength: CM of stated age, NAD, wearing mask due to susceptibility status    Mental Status Exam:     Mental Status exam performed 07/23/2024  and patient shows no significant changes from previous exam.     Hygiene:   good  Cooperation:  Cooperative  Eye Contact:  Good  Psychomotor Behavior:  Appropriate  Affect:  Full range  Mood: normal  Hopelessness: Optimistic  Speech:  Normal  Thought Process:  Goal directed and Linear  Thought Content:  Normal and Mood congruent  Suicidal:  None  Homicidal:  None  Hallucinations:  None  Delusion:  None  Memory:  Intact  Orientation:  Person, Place, Time and Situation  Reliability:  good  Insight:  Good  Judgement:  Good  Impulse Control:  Good  Physical/Medical Issues:  Yes chronic pain from previous work related accident        Lab Results:   No visits with results within 1 Month(s) from this visit.   Latest known visit with results is:   Office Visit on 04/24/2023   Component Date Value Ref Range Status    External Amphetamine Screen Urine 04/24/2023 Negative   Final    External Benzodiazepine Screen Uri* 04/24/2023 Negative   Final    External Cocaine Screen Urine 04/24/2023 Negative   Final    External THC Screen Urine 04/24/2023 Negative   Final    External Methadone Screen Urine 04/24/2023 Negative   Final    External Methamphetamine Screen Ur* 04/24/2023 Negative   Final    External Oxycodone Screen Urine 04/24/2023 Negative   Final    External Buprenorphine Screen Urine 04/24/2023 Positive (A)   Final    External MDMA 04/24/2023 Negative   Final    External Opiates Screen Urine 04/24/2023 Negative   Final       Assessment & " Plan   Diagnoses and all orders for this visit:    1. JUAN LUIS (generalized anxiety disorder) (Primary)  -     clonazePAM (KlonoPIN) 1 MG tablet; Take 1 tablet by mouth 2 (Two) Times a Day As Needed for Anxiety. for anxiety  Dispense: 60 tablet; Refill: 2    2. Psychophysiological insomnia  -     clonazePAM (KlonoPIN) 1 MG tablet; Take 1 tablet by mouth 2 (Two) Times a Day As Needed for Anxiety. for anxiety  Dispense: 60 tablet; Refill: 2  -     amitriptyline (ELAVIL) 100 MG tablet; Take 1 tablet by mouth At Night As Needed for Sleep.  Dispense: 30 tablet; Refill: 2    3. Panic disorder with agoraphobia  -     clonazePAM (KlonoPIN) 1 MG tablet; Take 1 tablet by mouth 2 (Two) Times a Day As Needed for Anxiety. for anxiety  Dispense: 60 tablet; Refill: 2    4. Social anxiety disorder  -     clonazePAM (KlonoPIN) 1 MG tablet; Take 1 tablet by mouth 2 (Two) Times a Day As Needed for Anxiety. for anxiety  Dispense: 60 tablet; Refill: 2          -Patient stable at baseline and no major issues noted today.  He still has symptoms but they are manageable with current regimen  -Reviewed previous available documentation  -Reviewed previous medical records and scanned to chart  -Reviewed most recent available labs  -MICHEAL reviewed and appropriate. Patient counseled on use of controlled substances.   -Continue amitriptyline 100 mg nightly as needed for insomnia  -Continue clonazepam 1 mg twice daily as needed for anxiety.  Patient advised of the risks of using benzodiazepines with concurrent opioid maintenance therapy.  In weighing the risks and benefits, patient is more active and functional during the day and is also taking an active role in his day-to-day life which is a functional improvement over before.  We will continue the medication for now.  The eventual goal will be to taper off of the medication completely or have patient taper off of his Suboxone, however he has been on benzodiazepines for an extended amount of time and  this may prove to be difficult. Patient will be maintained on lowest dose possible due to these issues and interactions.   -Encouraged to continue maintenance therapy of Suboxone   -Encourage cessation of tobacco products  -Encouraged patient to consider therapy      Visit Diagnoses:    ICD-10-CM ICD-9-CM   1. JUAN LUIS (generalized anxiety disorder)  F41.1 300.02   2. Psychophysiological insomnia  F51.04 307.42   3. Panic disorder with agoraphobia  F40.01 300.21   4. Social anxiety disorder  F40.10 300.23             TREATMENT PLAN/GOALS: Continue supportive psychotherapy efforts and medications as indicated. Treatment and medication options discussed during today's visit. Patient acknowledged and verbally consented to continue with current treatment plan and was educated on the importance of compliance with treatment and follow-up appointments.    MEDICATION ISSUES:    Discussed medication options and treatment plan of prescribed medication as well as the risks, benefits, and side effects including potential falls, possible impaired driving and metabolic adversities among others. Patient is agreeable to call the office with any worsening of symptoms or onset of side effects. Patient is agreeable to call 911 or go to the nearest ER should he/she begin having SI/HI.     MEDS ORDERED DURING VISIT:  New Medications Ordered This Visit   Medications    clonazePAM (KlonoPIN) 1 MG tablet     Sig: Take 1 tablet by mouth 2 (Two) Times a Day As Needed for Anxiety. for anxiety     Dispense:  60 tablet     Refill:  2    amitriptyline (ELAVIL) 100 MG tablet     Sig: Take 1 tablet by mouth At Night As Needed for Sleep.     Dispense:  30 tablet     Refill:  2       Return in about 3 months (around 10/23/2024).             This document has been electronically signed by Yanick Coon MD  July 23, 2024 13:18 EDT

## 2024-09-21 DIAGNOSIS — F51.04 PSYCHOPHYSIOLOGICAL INSOMNIA: ICD-10-CM

## 2024-09-21 DIAGNOSIS — F41.1 GAD (GENERALIZED ANXIETY DISORDER): ICD-10-CM

## 2024-09-21 DIAGNOSIS — F40.10 SOCIAL ANXIETY DISORDER: ICD-10-CM

## 2024-09-21 DIAGNOSIS — F40.01 PANIC DISORDER WITH AGORAPHOBIA: ICD-10-CM

## 2024-10-23 ENCOUNTER — OFFICE VISIT (OUTPATIENT)
Dept: PSYCHIATRY | Facility: CLINIC | Age: 59
End: 2024-10-23
Payer: MEDICARE

## 2024-10-23 VITALS
HEIGHT: 72 IN | DIASTOLIC BLOOD PRESSURE: 84 MMHG | HEART RATE: 68 BPM | OXYGEN SATURATION: 98 % | WEIGHT: 247.6 LBS | SYSTOLIC BLOOD PRESSURE: 132 MMHG | BODY MASS INDEX: 33.54 KG/M2

## 2024-10-23 DIAGNOSIS — F40.01 PANIC DISORDER WITH AGORAPHOBIA: ICD-10-CM

## 2024-10-23 DIAGNOSIS — F51.04 PSYCHOPHYSIOLOGICAL INSOMNIA: ICD-10-CM

## 2024-10-23 DIAGNOSIS — F41.1 GAD (GENERALIZED ANXIETY DISORDER): Primary | ICD-10-CM

## 2024-10-23 DIAGNOSIS — F40.10 SOCIAL ANXIETY DISORDER: ICD-10-CM

## 2024-10-23 RX ORDER — CLONAZEPAM 1 MG/1
1 TABLET ORAL 2 TIMES DAILY PRN
Qty: 60 TABLET | Refills: 2 | OUTPATIENT
Start: 2024-10-23

## 2024-10-23 RX ORDER — QUETIAPINE FUMARATE 100 MG/1
TABLET, FILM COATED ORAL
COMMUNITY
Start: 2024-10-09

## 2024-10-23 RX ORDER — AMITRIPTYLINE HYDROCHLORIDE 100 MG/1
100 TABLET ORAL NIGHTLY PRN
Qty: 30 TABLET | Refills: 2 | Status: SHIPPED | OUTPATIENT
Start: 2024-10-23

## 2024-10-23 RX ORDER — CLONAZEPAM 1 MG/1
1 TABLET ORAL 2 TIMES DAILY PRN
Qty: 60 TABLET | Refills: 2 | Status: SHIPPED | OUTPATIENT
Start: 2024-10-23

## 2024-10-23 NOTE — PROGRESS NOTES
Subjective   Macario Aguilar is a 59 y.o. male who presents today for follow up    Chief Complaint: Panic disorder, social anxiety, anxiety      History of Present Illness: Patient presented today for follow-up with his wife.  Since last visit, patient has been stable and doing well.  He has anxiety at times but overall is managing appropriately.  He is not having any medication side effects.  Sleep and appetite are stable.  He denies SI/HI/AVH.    The following portions of the patient's history were reviewed and updated as appropriate: allergies, current medications, past family history, past medical history, past social history, past surgical history and problem list.      Past Medical History:  Past Medical History:   Diagnosis Date    Anxiety        Social History:  Social History     Socioeconomic History    Marital status:    Tobacco Use    Smoking status: Never    Smokeless tobacco: Current     Types: Chew   Vaping Use    Vaping status: Never Used   Substance and Sexual Activity    Alcohol use: Never    Drug use: Never    Sexual activity: Defer       Family History:  Family History   Problem Relation Age of Onset    Anxiety disorder Mother     No Known Problems Father     Anxiety disorder Maternal Uncle        Past Surgical History:  No past surgical history on file.    Problem List:  Patient Active Problem List   Diagnosis    Type 2 diabetes mellitus, without long-term current use of insulin       Allergy:   Allergies   Allergen Reactions    Demerol [Meperidine] Hives    Penicillins Hives        Current Medications:   Current Outpatient Medications   Medication Sig Dispense Refill    albuterol sulfate  (90 Base) MCG/ACT inhaler Inhale 2 puffs Every 4 (Four) Hours As Needed for Wheezing. 18 g 0    amitriptyline (ELAVIL) 100 MG tablet Take 1 tablet by mouth At Night As Needed for Sleep. 30 tablet 2    amLODIPine (NORVASC) 5 MG tablet TAKE 1 TABLET BY MOUTH EVERY DAY FOR BLOOD PRESSURE. DOSE  "INCREASE.      atorvastatin (LIPITOR) 10 MG tablet TAKE 1 TABLET BY MOUTH 1 TIME EACH DAY.      BD Pen Needle Delfina U/F 32G X 4 MM misc 1 each by Other route Daily.      buprenorphine-naloxone (SUBOXONE) 8-2 MG film film Place  under the tongue Daily.      clonazePAM (KlonoPIN) 1 MG tablet Take 1 tablet by mouth 2 (Two) Times a Day As Needed for Anxiety. for anxiety 60 tablet 2    gabapentin (NEURONTIN) 800 MG tablet       Lantus 100 UNIT/ML injection INJECT 10 UNITS SUBCUTANEOUSLY EVERY NIGHT AT BEDTIME      lisinopril (PRINIVIL,ZESTRIL) 40 MG tablet       Narcan 4 MG/0.1ML nasal spray       Pro Comfort Lancets 31G misc USE TO CHECK BLOOD SUGAR LEVEL THREE TIMES A DAY      QUEtiapine (SEROquel) 100 MG tablet       sildenafil (REVATIO) 20 MG tablet Take 1 tablet by mouth.      triamcinolone (KENALOG) 0.1 % cream       cetirizine (zyrTEC) 10 MG tablet Take 1 tablet by mouth Daily. 30 tablet 11     No current facility-administered medications for this visit.       Review of Symptoms:    Review of Systems   Constitutional:  Negative for activity change (improved), chills, diaphoresis, fatigue, fever and unexpected weight loss.   HENT:  Negative for congestion, rhinorrhea, sinus pressure and sneezing.    Eyes: Negative.    Respiratory:  Positive for chest tightness. Negative for cough and shortness of breath.    Cardiovascular:  Positive for palpitations. Negative for chest pain and leg swelling.   Gastrointestinal:  Negative for nausea and vomiting.   Endocrine: Negative.    Genitourinary: Negative.    Musculoskeletal:  Positive for arthralgias, back pain and myalgias.   Skin: Negative.    Allergic/Immunologic: Negative.    Neurological:  Positive for tremors.   Hematological: Negative.    Psychiatric/Behavioral:  Negative for dysphoric mood and stress. The patient is nervous/anxious.          Physical Exam:   Blood pressure 132/84, pulse 68, height 182.9 cm (72.01\"), weight 112 kg (247 lb 9.6 oz), SpO2 98%.  "     Appearance:  male of stated age in no acute distress, NAD  Gait, Station, Strength: CM of stated age, NAD, wearing mask due to susceptibility status    Mental Status Exam:     Mental Status exam performed 10/23/2024  and patient shows no significant changes from previous exam.     Hygiene:   good  Cooperation:  Cooperative  Eye Contact:  Good  Psychomotor Behavior:  Appropriate  Affect:  Full range  Mood: normal  Hopelessness: Optimistic  Speech:  Normal  Thought Process:  Goal directed and Linear  Thought Content:  Normal and Mood congruent  Suicidal:  None  Homicidal:  None  Hallucinations:  None  Delusion:  None  Memory:  Intact  Orientation:  Person, Place, Time and Situation  Reliability:  good  Insight:  Good  Judgement:  Good  Impulse Control:  Good  Physical/Medical Issues:  Yes chronic pain from previous work related accident        Lab Results:   No visits with results within 1 Month(s) from this visit.   Latest known visit with results is:   Office Visit on 04/24/2023   Component Date Value Ref Range Status    External Amphetamine Screen Urine 04/24/2023 Negative   Final    External Benzodiazepine Screen Uri* 04/24/2023 Negative   Final    External Cocaine Screen Urine 04/24/2023 Negative   Final    External THC Screen Urine 04/24/2023 Negative   Final    External Methadone Screen Urine 04/24/2023 Negative   Final    External Methamphetamine Screen Ur* 04/24/2023 Negative   Final    External Oxycodone Screen Urine 04/24/2023 Negative   Final    External Buprenorphine Screen Urine 04/24/2023 Positive (A)   Final    External MDMA 04/24/2023 Negative   Final    External Opiates Screen Urine 04/24/2023 Negative   Final       Assessment & Plan   Diagnoses and all orders for this visit:    1. JUAN LUIS (generalized anxiety disorder) (Primary)  -     clonazePAM (KlonoPIN) 1 MG tablet; Take 1 tablet by mouth 2 (Two) Times a Day As Needed for Anxiety. for anxiety  Dispense: 60 tablet; Refill: 2    2.  Psychophysiological insomnia  -     clonazePAM (KlonoPIN) 1 MG tablet; Take 1 tablet by mouth 2 (Two) Times a Day As Needed for Anxiety. for anxiety  Dispense: 60 tablet; Refill: 2  -     amitriptyline (ELAVIL) 100 MG tablet; Take 1 tablet by mouth At Night As Needed for Sleep.  Dispense: 30 tablet; Refill: 2    3. Panic disorder with agoraphobia  -     clonazePAM (KlonoPIN) 1 MG tablet; Take 1 tablet by mouth 2 (Two) Times a Day As Needed for Anxiety. for anxiety  Dispense: 60 tablet; Refill: 2    4. Social anxiety disorder  -     clonazePAM (KlonoPIN) 1 MG tablet; Take 1 tablet by mouth 2 (Two) Times a Day As Needed for Anxiety. for anxiety  Dispense: 60 tablet; Refill: 2      -Patient continues to do very well.  No major issues noted today.  -Reviewed previous available documentation  -Reviewed previous medical records and scanned to chart  -Reviewed most recent available labs  -MICHEAL reviewed and appropriate. Patient counseled on use of controlled substances.   -Continue amitriptyline 100 mg nightly as needed for insomnia  -Continue clonazepam 1 mg twice daily as needed for anxiety.  Patient advised of the risks of using benzodiazepines with concurrent opioid maintenance therapy.  In weighing the risks and benefits, patient is more active and functional during the day and is also taking an active role in his day-to-day life which is a functional improvement over before.  We will continue the medication for now.  The eventual goal will be to taper off of the medication completely or have patient taper off of his Suboxone, however he has been on benzodiazepines for an extended amount of time and this may prove to be difficult. Patient will be maintained on lowest dose possible due to these issues and interactions.   -Encouraged to continue maintenance therapy of Suboxone   -Encourage cessation of tobacco products  -Encouraged patient to consider therapy      Visit Diagnoses:    ICD-10-CM ICD-9-CM   1. JUAN LUIS  (generalized anxiety disorder)  F41.1 300.02   2. Psychophysiological insomnia  F51.04 307.42   3. Panic disorder with agoraphobia  F40.01 300.21   4. Social anxiety disorder  F40.10 300.23         TREATMENT PLAN/GOALS: Continue supportive psychotherapy efforts and medications as indicated. Treatment and medication options discussed during today's visit. Patient acknowledged and verbally consented to continue with current treatment plan and was educated on the importance of compliance with treatment and follow-up appointments.    MEDICATION ISSUES:    Discussed medication options and treatment plan of prescribed medication as well as the risks, benefits, and side effects including potential falls, possible impaired driving and metabolic adversities among others. Patient is agreeable to call the office with any worsening of symptoms or onset of side effects. Patient is agreeable to call 911 or go to the nearest ER should he/she begin having SI/HI.     MEDS ORDERED DURING VISIT:  New Medications Ordered This Visit   Medications    clonazePAM (KlonoPIN) 1 MG tablet     Sig: Take 1 tablet by mouth 2 (Two) Times a Day As Needed for Anxiety. for anxiety     Dispense:  60 tablet     Refill:  2    amitriptyline (ELAVIL) 100 MG tablet     Sig: Take 1 tablet by mouth At Night As Needed for Sleep.     Dispense:  30 tablet     Refill:  2       Return in about 3 months (around 1/23/2025).             This document has been electronically signed by Yanick Coon MD  October 23, 2024 13:39 EDT

## 2025-01-13 DIAGNOSIS — F40.01 PANIC DISORDER WITH AGORAPHOBIA: ICD-10-CM

## 2025-01-13 DIAGNOSIS — F41.1 GAD (GENERALIZED ANXIETY DISORDER): ICD-10-CM

## 2025-01-13 DIAGNOSIS — F51.04 PSYCHOPHYSIOLOGICAL INSOMNIA: ICD-10-CM

## 2025-01-13 DIAGNOSIS — F40.10 SOCIAL ANXIETY DISORDER: ICD-10-CM

## 2025-01-14 RX ORDER — CLONAZEPAM 1 MG/1
1 TABLET ORAL 2 TIMES DAILY PRN
Qty: 60 TABLET | Refills: 0 | Status: SHIPPED | OUTPATIENT
Start: 2025-01-14 | End: 2025-01-15 | Stop reason: SDUPTHER

## 2025-01-14 RX ORDER — AMITRIPTYLINE HYDROCHLORIDE 100 MG/1
100 TABLET ORAL NIGHTLY PRN
Qty: 30 TABLET | Refills: 0 | Status: SHIPPED | OUTPATIENT
Start: 2025-01-14 | End: 2025-01-15 | Stop reason: SDUPTHER

## 2025-01-15 ENCOUNTER — OFFICE VISIT (OUTPATIENT)
Dept: PSYCHIATRY | Facility: CLINIC | Age: 60
End: 2025-01-15
Payer: MEDICARE

## 2025-01-15 VITALS
OXYGEN SATURATION: 98 % | HEART RATE: 112 BPM | DIASTOLIC BLOOD PRESSURE: 80 MMHG | HEIGHT: 72 IN | WEIGHT: 238.6 LBS | BODY MASS INDEX: 32.32 KG/M2 | SYSTOLIC BLOOD PRESSURE: 128 MMHG

## 2025-01-15 DIAGNOSIS — F40.01 PANIC DISORDER WITH AGORAPHOBIA: ICD-10-CM

## 2025-01-15 DIAGNOSIS — F41.1 GAD (GENERALIZED ANXIETY DISORDER): Primary | ICD-10-CM

## 2025-01-15 DIAGNOSIS — F40.10 SOCIAL ANXIETY DISORDER: ICD-10-CM

## 2025-01-15 DIAGNOSIS — F51.04 PSYCHOPHYSIOLOGICAL INSOMNIA: ICD-10-CM

## 2025-01-15 RX ORDER — AMITRIPTYLINE HYDROCHLORIDE 100 MG/1
100 TABLET ORAL NIGHTLY PRN
Qty: 30 TABLET | Refills: 2 | Status: SHIPPED | OUTPATIENT
Start: 2025-01-15

## 2025-01-15 RX ORDER — CLONAZEPAM 1 MG/1
1 TABLET ORAL 2 TIMES DAILY PRN
Qty: 60 TABLET | Refills: 2 | Status: SHIPPED | OUTPATIENT
Start: 2025-01-15

## 2025-01-15 NOTE — PROGRESS NOTES
Subjective   Macario Aguilar is a 59 y.o. male who presents today for follow up    Chief Complaint: Panic disorder, social anxiety, anxiety      History of Present Illness: Patient presenting today for follow-up with his wife.  Since last visit, patient has been doing well.  He reports no major mood or anxiety symptoms.  He feels the anxiety is well controlled with current medication regimen and he is not having any major side effects.  Sleep and appetite are stable.  He denies SI/HI/AVH.    The following portions of the patient's history were reviewed and updated as appropriate: allergies, current medications, past family history, past medical history, past social history, past surgical history and problem list.      Past Medical History:  Past Medical History:   Diagnosis Date    Anxiety     Diabetes        Social History:  Social History     Socioeconomic History    Marital status:    Tobacco Use    Smoking status: Never    Smokeless tobacco: Current     Types: Chew   Vaping Use    Vaping status: Never Used   Substance and Sexual Activity    Alcohol use: Never    Drug use: Never    Sexual activity: Defer       Family History:  Family History   Problem Relation Age of Onset    Anxiety disorder Mother     No Known Problems Father     Anxiety disorder Maternal Uncle        Past Surgical History:  No past surgical history on file.    Problem List:  Patient Active Problem List   Diagnosis    Type 2 diabetes mellitus, without long-term current use of insulin       Allergy:   Allergies   Allergen Reactions    Demerol [Meperidine] Hives    Penicillins Hives        Current Medications:   Current Outpatient Medications   Medication Sig Dispense Refill    albuterol sulfate  (90 Base) MCG/ACT inhaler Inhale 2 puffs Every 4 (Four) Hours As Needed for Wheezing. 18 g 0    amitriptyline (ELAVIL) 100 MG tablet take one tablet BY MOUTH EVERY NIGHT AT BEDTIME AS NEEDED FOR SLEEP 30 tablet 0    amLODIPine (NORVASC) 5 MG  "tablet TAKE 1 TABLET BY MOUTH EVERY DAY FOR BLOOD PRESSURE. DOSE INCREASE.      atorvastatin (LIPITOR) 10 MG tablet TAKE 1 TABLET BY MOUTH 1 TIME EACH DAY.      BD Pen Needle Delfina U/F 32G X 4 MM misc 1 each by Other route Daily.      buprenorphine-naloxone (SUBOXONE) 8-2 MG film film Place  under the tongue Daily.      clonazePAM (KlonoPIN) 1 MG tablet take one tablet BY MOUTH TWICE DAILY AS NEEDED FOR ANXIETY 60 tablet 0    gabapentin (NEURONTIN) 800 MG tablet       Lantus 100 UNIT/ML injection INJECT 10 UNITS SUBCUTANEOUSLY EVERY NIGHT AT BEDTIME      lisinopril (PRINIVIL,ZESTRIL) 40 MG tablet       Narcan 4 MG/0.1ML nasal spray       Pro Comfort Lancets 31G misc USE TO CHECK BLOOD SUGAR LEVEL THREE TIMES A DAY      QUEtiapine (SEROquel) 100 MG tablet       sildenafil (REVATIO) 20 MG tablet Take 1 tablet by mouth.      triamcinolone (KENALOG) 0.1 % cream       cetirizine (zyrTEC) 10 MG tablet Take 1 tablet by mouth Daily. 30 tablet 11     No current facility-administered medications for this visit.       Review of Symptoms:    Review of Systems   Constitutional:  Negative for activity change (improved), chills, diaphoresis, fatigue, fever and unexpected weight loss.   HENT:  Negative for congestion, rhinorrhea, sinus pressure and sneezing.    Eyes: Negative.    Respiratory:  Positive for chest tightness. Negative for cough and shortness of breath.    Cardiovascular:  Positive for palpitations. Negative for chest pain and leg swelling.   Gastrointestinal:  Negative for nausea and vomiting.   Endocrine: Negative.    Genitourinary: Negative.    Musculoskeletal:  Positive for arthralgias, back pain and myalgias.   Skin: Negative.    Allergic/Immunologic: Negative.    Neurological:  Positive for tremors.   Hematological: Negative.    Psychiatric/Behavioral:  Negative for dysphoric mood and stress. The patient is not nervous/anxious.          Physical Exam:   Blood pressure 128/80, pulse 112, height 182.9 cm (72.01\"), " weight 108 kg (238 lb 9.6 oz), SpO2 98%.      Appearance:  male of stated age in no acute distress, NAD  Gait, Station, Strength: CM of stated age, NAD, wearing mask due to susceptibility status    Mental Status Exam:     Mental Status exam performed 01/15/2025  and patient shows no significant changes from previous exam.     Hygiene:   good  Cooperation:  Cooperative  Eye Contact:  Good  Psychomotor Behavior:  Appropriate  Affect:  Full range  Mood: normal  Hopelessness: Optimistic  Speech:  Normal  Thought Process:  Goal directed and Linear  Thought Content:  Normal and Mood congruent  Suicidal:  None  Homicidal:  None  Hallucinations:  None  Delusion:  None  Memory:  Intact  Orientation:  Person, Place, Time and Situation  Reliability:  good  Insight:  Good  Judgement:  Good  Impulse Control:  Good  Physical/Medical Issues:  Yes chronic pain from previous work related accident        Lab Results:   No visits with results within 1 Month(s) from this visit.   Latest known visit with results is:   Office Visit on 04/24/2023   Component Date Value Ref Range Status    External Amphetamine Screen Urine 04/24/2023 Negative   Final    External Benzodiazepine Screen Uri* 04/24/2023 Negative   Final    External Cocaine Screen Urine 04/24/2023 Negative   Final    External THC Screen Urine 04/24/2023 Negative   Final    External Methadone Screen Urine 04/24/2023 Negative   Final    External Methamphetamine Screen Ur* 04/24/2023 Negative   Final    External Oxycodone Screen Urine 04/24/2023 Negative   Final    External Buprenorphine Screen Urine 04/24/2023 Positive (A)   Final    External MDMA 04/24/2023 Negative   Final    External Opiates Screen Urine 04/24/2023 Negative   Final       Assessment & Plan   Diagnoses and all orders for this visit:    1. JUAN LUIS (generalized anxiety disorder) (Primary)  -     clonazePAM (KlonoPIN) 1 MG tablet; Take 1 tablet by mouth 2 (Two) Times a Day As Needed for Anxiety. for anxiety   Dispense: 60 tablet; Refill: 2    2. Psychophysiological insomnia  -     clonazePAM (KlonoPIN) 1 MG tablet; Take 1 tablet by mouth 2 (Two) Times a Day As Needed for Anxiety. for anxiety  Dispense: 60 tablet; Refill: 2  -     amitriptyline (ELAVIL) 100 MG tablet; Take 1 tablet by mouth At Night As Needed for Sleep.  Dispense: 30 tablet; Refill: 2    3. Panic disorder with agoraphobia  -     clonazePAM (KlonoPIN) 1 MG tablet; Take 1 tablet by mouth 2 (Two) Times a Day As Needed for Anxiety. for anxiety  Dispense: 60 tablet; Refill: 2    4. Social anxiety disorder  -     clonazePAM (KlonoPIN) 1 MG tablet; Take 1 tablet by mouth 2 (Two) Times a Day As Needed for Anxiety. for anxiety  Dispense: 60 tablet; Refill: 2        -Patient remains stable.  No issues noted today.  -Reviewed previous available documentation  -Reviewed previous medical records and scanned to chart  -Reviewed most recent available labs  -MICHEAL reviewed and appropriate. Patient counseled on use of controlled substances.   -Continue amitriptyline 100 mg nightly as needed for insomnia  -Continue clonazepam 1 mg twice daily as needed for anxiety.  Patient advised of the risks of using benzodiazepines with concurrent opioid maintenance therapy.  In weighing the risks and benefits, patient is more active and functional during the day and is also taking an active role in his day-to-day life which is a functional improvement over before.  We will continue the medication for now.  The eventual goal will be to taper off of the medication completely or have patient taper off of his Suboxone, however he has been on benzodiazepines for an extended amount of time and this may prove to be difficult. Patient will be maintained on lowest dose possible due to these issues and interactions.   -Encouraged to continue maintenance therapy of Suboxone   -Encourage cessation of tobacco products  -Encouraged patient to consider therapy      Visit Diagnoses:    ICD-10-CM  ICD-9-CM   1. JUAN LUIS (generalized anxiety disorder)  F41.1 300.02   2. Psychophysiological insomnia  F51.04 307.42   3. Panic disorder with agoraphobia  F40.01 300.21   4. Social anxiety disorder  F40.10 300.23           TREATMENT PLAN/GOALS: Continue supportive psychotherapy efforts and medications as indicated. Treatment and medication options discussed during today's visit. Patient acknowledged and verbally consented to continue with current treatment plan and was educated on the importance of compliance with treatment and follow-up appointments.    MEDICATION ISSUES:    Discussed medication options and treatment plan of prescribed medication as well as the risks, benefits, and side effects including potential falls, possible impaired driving and metabolic adversities among others. Patient is agreeable to call the office with any worsening of symptoms or onset of side effects. Patient is agreeable to call 911 or go to the nearest ER should he/she begin having SI/HI.     MEDS ORDERED DURING VISIT:  New Medications Ordered This Visit   Medications    clonazePAM (KlonoPIN) 1 MG tablet     Sig: Take 1 tablet by mouth 2 (Two) Times a Day As Needed for Anxiety. for anxiety     Dispense:  60 tablet     Refill:  2     NA    amitriptyline (ELAVIL) 100 MG tablet     Sig: Take 1 tablet by mouth At Night As Needed for Sleep.     Dispense:  30 tablet     Refill:  2     NA       Return in about 3 months (around 4/15/2025).       I have reviewed the copied text and it is accurate as of 01/15/2025         This document has been electronically signed by Yanick Coon MD  January 15, 2025 13:25 EST

## 2025-04-09 ENCOUNTER — OFFICE VISIT (OUTPATIENT)
Dept: PSYCHIATRY | Facility: CLINIC | Age: 60
End: 2025-04-09
Payer: MEDICARE

## 2025-04-09 VITALS
BODY MASS INDEX: 33.18 KG/M2 | DIASTOLIC BLOOD PRESSURE: 80 MMHG | OXYGEN SATURATION: 96 % | SYSTOLIC BLOOD PRESSURE: 132 MMHG | WEIGHT: 245 LBS | HEART RATE: 112 BPM | HEIGHT: 72 IN

## 2025-04-09 DIAGNOSIS — F40.01 PANIC DISORDER WITH AGORAPHOBIA: ICD-10-CM

## 2025-04-09 DIAGNOSIS — F40.10 SOCIAL ANXIETY DISORDER: ICD-10-CM

## 2025-04-09 DIAGNOSIS — F41.1 GAD (GENERALIZED ANXIETY DISORDER): Primary | ICD-10-CM

## 2025-04-09 DIAGNOSIS — F51.04 PSYCHOPHYSIOLOGICAL INSOMNIA: ICD-10-CM

## 2025-04-09 PROCEDURE — 1159F MED LIST DOCD IN RCRD: CPT | Performed by: PSYCHIATRY & NEUROLOGY

## 2025-04-09 PROCEDURE — 1160F RVW MEDS BY RX/DR IN RCRD: CPT | Performed by: PSYCHIATRY & NEUROLOGY

## 2025-04-09 PROCEDURE — 99214 OFFICE O/P EST MOD 30 MIN: CPT | Performed by: PSYCHIATRY & NEUROLOGY

## 2025-04-09 RX ORDER — DIAZEPAM 10 MG/1
10 TABLET ORAL 2 TIMES DAILY PRN
Qty: 60 TABLET | Refills: 0 | Status: SHIPPED | OUTPATIENT
Start: 2025-04-09

## 2025-04-09 RX ORDER — AMITRIPTYLINE HYDROCHLORIDE 100 MG/1
100 TABLET ORAL NIGHTLY PRN
Qty: 30 TABLET | Refills: 2 | Status: SHIPPED | OUTPATIENT
Start: 2025-04-09

## 2025-04-09 NOTE — PROGRESS NOTES
"Subjective   Macario Aguilar is a 59 y.o. male who presents today for follow up    Chief Complaint: Panic disorder, social anxiety, anxiety      History of Present Illness: Patient presented today for follow-up.  Since last visit, he reports that he has been struggling with his anxiety and states it is, \"through the roof,\" and he tends to get worked up over little things.  He has been working to taper and reduce his Suboxone which is going well so far.  He is not having any current medication side effects.  Sleep and appetite are stable.  He denies SI/HI/AVH.    The following portions of the patient's history were reviewed and updated as appropriate: allergies, current medications, past family history, past medical history, past social history, past surgical history and problem list.      Past Medical History:  Past Medical History:   Diagnosis Date    Anxiety     Diabetes        Social History:  Social History     Socioeconomic History    Marital status:    Tobacco Use    Smoking status: Never    Smokeless tobacco: Current     Types: Chew   Vaping Use    Vaping status: Never Used   Substance and Sexual Activity    Alcohol use: Never    Drug use: Never    Sexual activity: Defer       Family History:  Family History   Problem Relation Age of Onset    Anxiety disorder Mother     No Known Problems Father     Anxiety disorder Maternal Uncle        Past Surgical History:  No past surgical history on file.    Problem List:  Patient Active Problem List   Diagnosis    Type 2 diabetes mellitus, without long-term current use of insulin       Allergy:   Allergies   Allergen Reactions    Demerol [Meperidine] Hives    Penicillins Hives        Current Medications:   Current Outpatient Medications   Medication Sig Dispense Refill    albuterol sulfate  (90 Base) MCG/ACT inhaler Inhale 2 puffs Every 4 (Four) Hours As Needed for Wheezing. 18 g 0    amitriptyline (ELAVIL) 100 MG tablet Take 1 tablet by mouth At Night As " Needed for Sleep. 30 tablet 2    amLODIPine (NORVASC) 5 MG tablet TAKE 1 TABLET BY MOUTH EVERY DAY FOR BLOOD PRESSURE. DOSE INCREASE.      atorvastatin (LIPITOR) 10 MG tablet TAKE 1 TABLET BY MOUTH 1 TIME EACH DAY.      BD Pen Needle Delfina U/F 32G X 4 MM misc 1 each by Other route Daily.      buprenorphine-naloxone (SUBOXONE) 8-2 MG film film Place  under the tongue Daily.      clonazePAM (KlonoPIN) 1 MG tablet Take 1 tablet by mouth 2 (Two) Times a Day As Needed for Anxiety. for anxiety 60 tablet 2    gabapentin (NEURONTIN) 800 MG tablet       Lantus 100 UNIT/ML injection INJECT 10 UNITS SUBCUTANEOUSLY EVERY NIGHT AT BEDTIME      lisinopril (PRINIVIL,ZESTRIL) 40 MG tablet       Narcan 4 MG/0.1ML nasal spray       Pro Comfort Lancets 31G misc USE TO CHECK BLOOD SUGAR LEVEL THREE TIMES A DAY      QUEtiapine (SEROquel) 100 MG tablet       sildenafil (REVATIO) 20 MG tablet Take 1 tablet by mouth.      triamcinolone (KENALOG) 0.1 % cream       cetirizine (zyrTEC) 10 MG tablet Take 1 tablet by mouth Daily. 30 tablet 11     No current facility-administered medications for this visit.       Review of Symptoms:    Review of Systems   Constitutional:  Negative for activity change (improved), chills, diaphoresis, fatigue, fever and unexpected weight loss.   HENT:  Negative for congestion, rhinorrhea, sinus pressure and sneezing.    Eyes: Negative.    Respiratory:  Positive for chest tightness. Negative for cough and shortness of breath.    Cardiovascular:  Positive for palpitations. Negative for chest pain and leg swelling.   Gastrointestinal:  Negative for nausea and vomiting.   Endocrine: Negative.    Genitourinary: Negative.    Musculoskeletal:  Positive for arthralgias, back pain and myalgias.   Skin: Negative.    Allergic/Immunologic: Negative.    Neurological:  Positive for tremors.   Hematological: Negative.    Psychiatric/Behavioral:  Positive for stress. Negative for dysphoric mood. The patient is nervous/anxious.   "        Physical Exam:   Blood pressure 132/80, pulse 112, height 182.9 cm (72.01\"), weight 111 kg (245 lb), SpO2 96%.      Appearance:  male of stated age in no acute distress, NAD  Gait, Station, Strength: CM of stated age, NAD, wearing mask due to susceptibility status    Mental Status Exam:     Hygiene:   good  Cooperation:  Cooperative  Eye Contact:  Good  Psychomotor Behavior:  Appropriate  Affect:  Restricted  Mood: anxious  Hopelessness: Optimistic  Speech:  Normal  Thought Process:  Goal directed and Linear  Thought Content:  Normal and Mood congruent  Suicidal:  None  Homicidal:  None  Hallucinations:  None  Delusion:  None  Memory:  Intact  Orientation:  Person, Place, Time and Situation  Reliability:  good  Insight:  Good  Judgement:  Good  Impulse Control:  Good  Physical/Medical Issues:  Yes chronic pain from previous work related accident        Lab Results:   No visits with results within 1 Month(s) from this visit.   Latest known visit with results is:   Office Visit on 04/24/2023   Component Date Value Ref Range Status    External Amphetamine Screen Urine 04/24/2023 Negative   Final    External Benzodiazepine Screen Uri* 04/24/2023 Negative   Final    External Cocaine Screen Urine 04/24/2023 Negative   Final    External THC Screen Urine 04/24/2023 Negative   Final    External Methadone Screen Urine 04/24/2023 Negative   Final    External Methamphetamine Screen Ur* 04/24/2023 Negative   Final    External Oxycodone Screen Urine 04/24/2023 Negative   Final    External Buprenorphine Screen Urine 04/24/2023 Positive (A)   Final    External MDMA 04/24/2023 Negative   Final    External Opiates Screen Urine 04/24/2023 Negative   Final       Assessment & Plan   Diagnoses and all orders for this visit:    1. JUAN LUIS (generalized anxiety disorder) (Primary)  -     diazePAM (Valium) 10 MG tablet; Take 1 tablet by mouth 2 (Two) Times a Day As Needed for Anxiety.  Dispense: 60 tablet; Refill: 0    2. " Psychophysiological insomnia  -     diazePAM (Valium) 10 MG tablet; Take 1 tablet by mouth 2 (Two) Times a Day As Needed for Anxiety.  Dispense: 60 tablet; Refill: 0  -     amitriptyline (ELAVIL) 100 MG tablet; Take 1 tablet by mouth At Night As Needed for Sleep.  Dispense: 30 tablet; Refill: 2    3. Panic disorder with agoraphobia  -     diazePAM (Valium) 10 MG tablet; Take 1 tablet by mouth 2 (Two) Times a Day As Needed for Anxiety.  Dispense: 60 tablet; Refill: 0    4. Social anxiety disorder  -     diazePAM (Valium) 10 MG tablet; Take 1 tablet by mouth 2 (Two) Times a Day As Needed for Anxiety.  Dispense: 60 tablet; Refill: 0      -Patient having some worsening anxiety through the day with panic symptoms  -Reviewed previous available documentation  -Reviewed previous medical records and scanned to chart  -Reviewed most recent available labs  -MICHEAL reviewed and appropriate. Patient counseled on use of controlled substances.   -Continue amitriptyline 100 mg nightly as needed for insomnia  - Discontinue clonazepam 1 mg twice daily as needed for anxiety.    - Start Valium 10 mg p.o. twice daily as needed for anxiety or panic   -Patient advised of the risks of using benzodiazepines with concurrent opioid maintenance therapy.  In weighing the risks and benefits, patient is more active and functional during the day and is also taking an active role in his day-to-day life which is a functional improvement over before.    -Encouraged to continue maintenance therapy of Suboxone   -Encourage cessation of tobacco products  -Encouraged patient to consider therapy      Visit Diagnoses:    ICD-10-CM ICD-9-CM   1. JUAN LUIS (generalized anxiety disorder)  F41.1 300.02   2. Psychophysiological insomnia  F51.04 307.42   3. Panic disorder with agoraphobia  F40.01 300.21   4. Social anxiety disorder  F40.10 300.23             TREATMENT PLAN/GOALS: Continue supportive psychotherapy efforts and medications as indicated. Treatment and  medication options discussed during today's visit. Patient acknowledged and verbally consented to continue with current treatment plan and was educated on the importance of compliance with treatment and follow-up appointments.    MEDICATION ISSUES:    Discussed medication options and treatment plan of prescribed medication as well as the risks, benefits, and side effects including potential falls, possible impaired driving and metabolic adversities among others. Patient is agreeable to call the office with any worsening of symptoms or onset of side effects. Patient is agreeable to call 911 or go to the nearest ER should he/she begin having SI/HI.     MEDS ORDERED DURING VISIT:  New Medications Ordered This Visit   Medications    diazePAM (Valium) 10 MG tablet     Sig: Take 1 tablet by mouth 2 (Two) Times a Day As Needed for Anxiety.     Dispense:  60 tablet     Refill:  0    amitriptyline (ELAVIL) 100 MG tablet     Sig: Take 1 tablet by mouth At Night As Needed for Sleep.     Dispense:  30 tablet     Refill:  2     NA       Return in about 3 months (around 7/9/2025).       I have reviewed the copied text and it is accurate as of 04/09/2025         This document has been electronically signed by Yanick Coon MD  April 9, 2025 13:29 EDT

## 2025-05-08 DIAGNOSIS — F41.1 GAD (GENERALIZED ANXIETY DISORDER): ICD-10-CM

## 2025-05-08 DIAGNOSIS — F51.04 PSYCHOPHYSIOLOGICAL INSOMNIA: ICD-10-CM

## 2025-05-08 DIAGNOSIS — F40.10 SOCIAL ANXIETY DISORDER: ICD-10-CM

## 2025-05-08 DIAGNOSIS — F40.01 PANIC DISORDER WITH AGORAPHOBIA: ICD-10-CM

## 2025-05-09 RX ORDER — DIAZEPAM 10 MG/1
10 TABLET ORAL 2 TIMES DAILY PRN
Qty: 60 TABLET | Refills: 0 | Status: SHIPPED | OUTPATIENT
Start: 2025-05-09

## 2025-06-02 DIAGNOSIS — F40.01 PANIC DISORDER WITH AGORAPHOBIA: ICD-10-CM

## 2025-06-02 DIAGNOSIS — F40.10 SOCIAL ANXIETY DISORDER: ICD-10-CM

## 2025-06-02 DIAGNOSIS — F41.1 GAD (GENERALIZED ANXIETY DISORDER): ICD-10-CM

## 2025-06-02 DIAGNOSIS — F51.04 PSYCHOPHYSIOLOGICAL INSOMNIA: ICD-10-CM

## 2025-06-02 RX ORDER — DIAZEPAM 10 MG/1
10 TABLET ORAL 2 TIMES DAILY PRN
Qty: 60 TABLET | Refills: 0 | Status: CANCELLED | OUTPATIENT
Start: 2025-06-02

## 2025-06-02 RX ORDER — AMITRIPTYLINE HYDROCHLORIDE 100 MG/1
100 TABLET ORAL NIGHTLY PRN
Qty: 30 TABLET | Refills: 2 | Status: SHIPPED | OUTPATIENT
Start: 2025-06-02

## 2025-06-02 RX ORDER — DIAZEPAM 10 MG/1
10 TABLET ORAL 2 TIMES DAILY PRN
Qty: 60 TABLET | Refills: 0 | Status: SHIPPED | OUTPATIENT
Start: 2025-06-02

## 2025-07-02 DIAGNOSIS — F40.10 SOCIAL ANXIETY DISORDER: ICD-10-CM

## 2025-07-02 DIAGNOSIS — F51.04 PSYCHOPHYSIOLOGICAL INSOMNIA: ICD-10-CM

## 2025-07-02 DIAGNOSIS — F40.01 PANIC DISORDER WITH AGORAPHOBIA: ICD-10-CM

## 2025-07-02 DIAGNOSIS — F41.1 GAD (GENERALIZED ANXIETY DISORDER): ICD-10-CM

## 2025-07-03 RX ORDER — DIAZEPAM 10 MG/1
10 TABLET ORAL 2 TIMES DAILY PRN
Qty: 60 TABLET | Refills: 0 | Status: SHIPPED | OUTPATIENT
Start: 2025-07-03

## 2025-07-29 ENCOUNTER — OFFICE VISIT (OUTPATIENT)
Dept: PSYCHIATRY | Facility: CLINIC | Age: 60
End: 2025-07-29
Payer: MEDICARE

## 2025-07-29 VITALS
HEART RATE: 96 BPM | DIASTOLIC BLOOD PRESSURE: 87 MMHG | SYSTOLIC BLOOD PRESSURE: 123 MMHG | BODY MASS INDEX: 32.23 KG/M2 | WEIGHT: 238 LBS | HEIGHT: 72 IN

## 2025-07-29 DIAGNOSIS — F40.10 SOCIAL ANXIETY DISORDER: ICD-10-CM

## 2025-07-29 DIAGNOSIS — F40.01 PANIC DISORDER WITH AGORAPHOBIA: ICD-10-CM

## 2025-07-29 DIAGNOSIS — F51.04 PSYCHOPHYSIOLOGICAL INSOMNIA: ICD-10-CM

## 2025-07-29 DIAGNOSIS — F41.1 GAD (GENERALIZED ANXIETY DISORDER): Primary | ICD-10-CM

## 2025-07-29 PROCEDURE — 1160F RVW MEDS BY RX/DR IN RCRD: CPT | Performed by: PSYCHIATRY & NEUROLOGY

## 2025-07-29 PROCEDURE — 1159F MED LIST DOCD IN RCRD: CPT | Performed by: PSYCHIATRY & NEUROLOGY

## 2025-07-29 PROCEDURE — 99214 OFFICE O/P EST MOD 30 MIN: CPT | Performed by: PSYCHIATRY & NEUROLOGY

## 2025-07-29 RX ORDER — DIAZEPAM 10 MG/1
10 TABLET ORAL 2 TIMES DAILY PRN
Qty: 60 TABLET | Refills: 2 | Status: SHIPPED | OUTPATIENT
Start: 2025-07-29

## 2025-07-29 RX ORDER — LOSARTAN POTASSIUM 25 MG/1
TABLET ORAL
COMMUNITY
Start: 2025-07-28

## 2025-07-29 NOTE — PROGRESS NOTES
Subjective   Macario Aguilar is a 60 y.o. male who presents today for follow up    Chief Complaint: Panic disorder, social anxiety, anxiety      History of Present Illness: Patient reports that since last visit, there have been no major changes to symptom burden or social situation.  He reports that he is doing well and symptoms are well managed with current medication regimen.  He is not having any side effects.    The following portions of the patient's history were reviewed and updated as appropriate: allergies, current medications, past family history, past medical history, past social history, past surgical history and problem list.      Past Medical History:  Past Medical History:   Diagnosis Date    Anxiety     Diabetes        Social History:  Social History     Socioeconomic History    Marital status:    Tobacco Use    Smoking status: Never    Smokeless tobacco: Current     Types: Chew   Vaping Use    Vaping status: Never Used   Substance and Sexual Activity    Alcohol use: Never    Drug use: Never    Sexual activity: Defer       Family History:  Family History   Problem Relation Age of Onset    Anxiety disorder Mother     No Known Problems Father     Anxiety disorder Maternal Uncle        Past Surgical History:  No past surgical history on file.    Problem List:  Patient Active Problem List   Diagnosis    Type 2 diabetes mellitus, without long-term current use of insulin       Allergy:   Allergies   Allergen Reactions    Demerol [Meperidine] Hives    Penicillins Hives        Current Medications:   Current Outpatient Medications   Medication Sig Dispense Refill    albuterol sulfate  (90 Base) MCG/ACT inhaler Inhale 2 puffs Every 4 (Four) Hours As Needed for Wheezing. 18 g 0    amitriptyline (ELAVIL) 100 MG tablet Take 1 tablet by mouth At Night As Needed for Sleep. 30 tablet 2    amLODIPine (NORVASC) 5 MG tablet TAKE 1 TABLET BY MOUTH EVERY DAY FOR BLOOD PRESSURE. DOSE INCREASE.      atorvastatin  "(LIPITOR) 10 MG tablet TAKE 1 TABLET BY MOUTH 1 TIME EACH DAY.      BD Pen Needle Delfina U/F 32G X 4 MM misc 1 each by Other route Daily.      buprenorphine-naloxone (SUBOXONE) 8-2 MG film film Place  under the tongue Daily.      diazePAM (VALIUM) 10 MG tablet Take 1 tablet by mouth 2 (Two) Times a Day As Needed for Anxiety. for anxiety 60 tablet 0    gabapentin (NEURONTIN) 800 MG tablet       Lantus 100 UNIT/ML injection INJECT 10 UNITS SUBCUTANEOUSLY EVERY NIGHT AT BEDTIME      lisinopril (PRINIVIL,ZESTRIL) 40 MG tablet       losartan (COZAAR) 25 MG tablet       Narcan 4 MG/0.1ML nasal spray       Pro Comfort Lancets 31G misc USE TO CHECK BLOOD SUGAR LEVEL THREE TIMES A DAY      QUEtiapine (SEROquel) 100 MG tablet       sildenafil (REVATIO) 20 MG tablet Take 1 tablet by mouth.      triamcinolone (KENALOG) 0.1 % cream       cetirizine (zyrTEC) 10 MG tablet Take 1 tablet by mouth Daily. 30 tablet 11     No current facility-administered medications for this visit.       Review of Symptoms:    Review of Systems   Constitutional:  Negative for activity change (improved), chills, diaphoresis, fatigue, fever and unexpected weight loss.   HENT:  Negative for congestion, rhinorrhea, sinus pressure and sneezing.    Eyes: Negative.    Respiratory:  Positive for chest tightness. Negative for cough and shortness of breath.    Cardiovascular:  Positive for palpitations. Negative for chest pain and leg swelling.   Gastrointestinal:  Negative for nausea and vomiting.   Endocrine: Negative.    Genitourinary: Negative.    Musculoskeletal:  Positive for arthralgias, back pain and myalgias.   Skin: Negative.    Allergic/Immunologic: Negative.    Neurological:  Positive for tremors.   Hematological: Negative.    Psychiatric/Behavioral:  Positive for stress. Negative for dysphoric mood. The patient is nervous/anxious.          Physical Exam:   Blood pressure 123/87, pulse 96, height 182.9 cm (72.01\"), weight 108 kg (238 lb).  "     Appearance:  male of stated age in no acute distress, NAD  Gait, Station, Strength: CM of stated age, NAD, wearing mask due to susceptibility status    Mental Status Exam:     Hygiene:   good  Cooperation:  Cooperative  Eye Contact:  Good  Psychomotor Behavior:  Appropriate  Affect:  Full range  Mood: anxious but managed well  Hopelessness: Optimistic  Speech:  Normal  Thought Process:  Goal directed and Linear  Thought Content:  Normal and Mood congruent  Suicidal:  None  Homicidal:  None  Hallucinations:  None  Delusion:  None  Memory:  Intact  Orientation:  Person, Place, Time and Situation  Reliability:  good  Insight:  Good  Judgement:  Good  Impulse Control:  Good  Physical/Medical Issues:  Yes chronic pain from previous work related accident       Lab Results:   No visits with results within 1 Month(s) from this visit.   Latest known visit with results is:   Office Visit on 04/24/2023   Component Date Value Ref Range Status    External Amphetamine Screen Urine 04/24/2023 Negative   Final    External Benzodiazepine Screen Uri* 04/24/2023 Negative   Final    External Cocaine Screen Urine 04/24/2023 Negative   Final    External THC Screen Urine 04/24/2023 Negative   Final    External Methadone Screen Urine 04/24/2023 Negative   Final    External Methamphetamine Screen Ur* 04/24/2023 Negative   Final    External Oxycodone Screen Urine 04/24/2023 Negative   Final    External Buprenorphine Screen Urine 04/24/2023 Positive (A)   Final    External MDMA 04/24/2023 Negative   Final    External Opiates Screen Urine 04/24/2023 Negative   Final       Assessment & Plan   Diagnoses and all orders for this visit:    1. JUAN LUIS (generalized anxiety disorder) (Primary)  -     diazePAM (VALIUM) 10 MG tablet; Take 1 tablet by mouth 2 (Two) Times a Day As Needed for Anxiety. for anxiety  Dispense: 60 tablet; Refill: 2    2. Psychophysiological insomnia  -     diazePAM (VALIUM) 10 MG tablet; Take 1 tablet by mouth 2 (Two)  Times a Day As Needed for Anxiety. for anxiety  Dispense: 60 tablet; Refill: 2    3. Panic disorder with agoraphobia  -     diazePAM (VALIUM) 10 MG tablet; Take 1 tablet by mouth 2 (Two) Times a Day As Needed for Anxiety. for anxiety  Dispense: 60 tablet; Refill: 2    4. Social anxiety disorder  -     diazePAM (VALIUM) 10 MG tablet; Take 1 tablet by mouth 2 (Two) Times a Day As Needed for Anxiety. for anxiety  Dispense: 60 tablet; Refill: 2        -Patient overall doing fairly well.  No major issues noted today.  He reports that he feels that Valium control symptoms more efficacious later than clonazepam  -Reviewed previous available documentation  -Reviewed previous medical records and scanned to chart  -Reviewed most recent available labs  -MICHEAL reviewed and appropriate. Patient counseled on use of controlled substances.   -Continue amitriptyline 100 mg nightly as needed for insomnia  -Continue Valium 10 mg p.o. twice daily as needed for anxiety or panic   -Patient advised of the risks of using benzodiazepines with concurrent opioid maintenance therapy.  In weighing the risks and benefits, patient is more active and functional during the day and is also taking an active role in his day-to-day life which is a functional improvement over before.    -Encouraged to continue maintenance therapy of Suboxone   -Encourage cessation of tobacco products  -Encouraged patient to consider therapy      Visit Diagnoses:    ICD-10-CM ICD-9-CM   1. JUAN LUIS (generalized anxiety disorder)  F41.1 300.02   2. Psychophysiological insomnia  F51.04 307.42   3. Panic disorder with agoraphobia  F40.01 300.21   4. Social anxiety disorder  F40.10 300.23         TREATMENT PLAN/GOALS: Continue supportive psychotherapy efforts and medications as indicated. Treatment and medication options discussed during today's visit. Patient acknowledged and verbally consented to continue with current treatment plan and was educated on the importance of  compliance with treatment and follow-up appointments.    MEDICATION ISSUES:    Discussed medication options and treatment plan of prescribed medication as well as the risks, benefits, and side effects including potential falls, possible impaired driving and metabolic adversities among others. Patient is agreeable to call the office with any worsening of symptoms or onset of side effects. Patient is agreeable to call 911 or go to the nearest ER should he/she begin having SI/HI.     MEDS ORDERED DURING VISIT:  New Medications Ordered This Visit   Medications    diazePAM (VALIUM) 10 MG tablet     Sig: Take 1 tablet by mouth 2 (Two) Times a Day As Needed for Anxiety. for anxiety     Dispense:  60 tablet     Refill:  2     NA       Return in about 3 months (around 10/29/2025).       I have reviewed the copied text and it is accurate as of 07/29/2025         This document has been electronically signed by Yanick Coon MD  July 29, 2025 10:15 EDT

## 2025-08-14 ENCOUNTER — TRANSCRIBE ORDERS (OUTPATIENT)
Dept: LAB | Facility: HOSPITAL | Age: 60
End: 2025-08-14
Payer: MEDICARE

## 2025-08-14 DIAGNOSIS — R94.5 NONSPECIFIC ABNORMAL RESULTS OF LIVER FUNCTION STUDY: Primary | ICD-10-CM
